# Patient Record
Sex: FEMALE | Race: WHITE | NOT HISPANIC OR LATINO
[De-identification: names, ages, dates, MRNs, and addresses within clinical notes are randomized per-mention and may not be internally consistent; named-entity substitution may affect disease eponyms.]

---

## 2017-01-24 ENCOUNTER — APPOINTMENT (OUTPATIENT)
Dept: CARDIOLOGY | Facility: CLINIC | Age: 77
End: 2017-01-24

## 2017-01-24 ENCOUNTER — APPOINTMENT (OUTPATIENT)
Dept: ELECTROPHYSIOLOGY | Facility: CLINIC | Age: 77
End: 2017-01-24

## 2017-01-24 ENCOUNTER — NON-APPOINTMENT (OUTPATIENT)
Age: 77
End: 2017-01-24

## 2017-01-24 VITALS
HEIGHT: 65.5 IN | DIASTOLIC BLOOD PRESSURE: 89 MMHG | BODY MASS INDEX: 32.26 KG/M2 | HEART RATE: 85 BPM | OXYGEN SATURATION: 98 % | WEIGHT: 196 LBS | SYSTOLIC BLOOD PRESSURE: 140 MMHG

## 2017-01-25 ENCOUNTER — RESULT CHARGE (OUTPATIENT)
Age: 77
End: 2017-01-25

## 2017-02-27 ENCOUNTER — RX RENEWAL (OUTPATIENT)
Age: 77
End: 2017-02-27

## 2017-03-07 ENCOUNTER — RX RENEWAL (OUTPATIENT)
Age: 77
End: 2017-03-07

## 2017-03-27 ENCOUNTER — NON-APPOINTMENT (OUTPATIENT)
Age: 77
End: 2017-03-27

## 2017-03-27 ENCOUNTER — APPOINTMENT (OUTPATIENT)
Dept: CARDIOLOGY | Facility: CLINIC | Age: 77
End: 2017-03-27

## 2017-03-27 VITALS
SYSTOLIC BLOOD PRESSURE: 157 MMHG | OXYGEN SATURATION: 98 % | BODY MASS INDEX: 32.92 KG/M2 | WEIGHT: 200 LBS | HEIGHT: 65.5 IN | DIASTOLIC BLOOD PRESSURE: 85 MMHG | HEART RATE: 75 BPM

## 2017-03-27 RX ORDER — ATORVASTATIN CALCIUM 10 MG/1
10 TABLET, FILM COATED ORAL
Qty: 90 | Refills: 3 | Status: DISCONTINUED | COMMUNITY
Start: 2017-02-27 | End: 2017-03-27

## 2017-04-05 ENCOUNTER — APPOINTMENT (OUTPATIENT)
Dept: CARDIOLOGY | Facility: CLINIC | Age: 77
End: 2017-04-05

## 2017-04-05 ENCOUNTER — OTHER (OUTPATIENT)
Age: 77
End: 2017-04-05

## 2017-05-23 ENCOUNTER — TRANSCRIPTION ENCOUNTER (OUTPATIENT)
Age: 77
End: 2017-05-23

## 2017-06-21 ENCOUNTER — NON-APPOINTMENT (OUTPATIENT)
Age: 77
End: 2017-06-21

## 2017-06-21 ENCOUNTER — APPOINTMENT (OUTPATIENT)
Dept: CARDIOLOGY | Facility: CLINIC | Age: 77
End: 2017-06-21

## 2017-06-21 VITALS
WEIGHT: 200 LBS | BODY MASS INDEX: 32.92 KG/M2 | SYSTOLIC BLOOD PRESSURE: 149 MMHG | HEIGHT: 65.5 IN | OXYGEN SATURATION: 94 % | DIASTOLIC BLOOD PRESSURE: 86 MMHG | HEART RATE: 96 BPM

## 2017-09-25 ENCOUNTER — APPOINTMENT (OUTPATIENT)
Dept: CARDIOLOGY | Facility: CLINIC | Age: 77
End: 2017-09-25
Payer: MEDICARE

## 2017-09-25 ENCOUNTER — NON-APPOINTMENT (OUTPATIENT)
Age: 77
End: 2017-09-25

## 2017-09-25 VITALS
DIASTOLIC BLOOD PRESSURE: 78 MMHG | HEIGHT: 65.5 IN | WEIGHT: 192 LBS | SYSTOLIC BLOOD PRESSURE: 136 MMHG | OXYGEN SATURATION: 96 % | BODY MASS INDEX: 31.6 KG/M2 | HEART RATE: 82 BPM

## 2017-09-25 PROCEDURE — 93000 ELECTROCARDIOGRAM COMPLETE: CPT

## 2017-11-18 ENCOUNTER — TRANSCRIPTION ENCOUNTER (OUTPATIENT)
Age: 77
End: 2017-11-18

## 2018-01-11 ENCOUNTER — NON-APPOINTMENT (OUTPATIENT)
Age: 78
End: 2018-01-11

## 2018-01-11 ENCOUNTER — APPOINTMENT (OUTPATIENT)
Dept: CARDIOLOGY | Facility: CLINIC | Age: 78
End: 2018-01-11
Payer: MEDICARE

## 2018-01-11 VITALS
HEART RATE: 82 BPM | DIASTOLIC BLOOD PRESSURE: 80 MMHG | WEIGHT: 183 LBS | HEIGHT: 65.5 IN | SYSTOLIC BLOOD PRESSURE: 125 MMHG | BODY MASS INDEX: 30.12 KG/M2 | OXYGEN SATURATION: 96 %

## 2018-01-11 PROCEDURE — 93000 ELECTROCARDIOGRAM COMPLETE: CPT

## 2018-01-11 PROCEDURE — 99214 OFFICE O/P EST MOD 30 MIN: CPT

## 2018-04-16 ENCOUNTER — OUTPATIENT (OUTPATIENT)
Dept: OUTPATIENT SERVICES | Facility: HOSPITAL | Age: 78
LOS: 1 days | End: 2018-04-16
Payer: MEDICARE

## 2018-04-16 VITALS
TEMPERATURE: 98 F | HEIGHT: 66 IN | RESPIRATION RATE: 16 BRPM | DIASTOLIC BLOOD PRESSURE: 67 MMHG | HEART RATE: 77 BPM | WEIGHT: 179.9 LBS | OXYGEN SATURATION: 98 % | SYSTOLIC BLOOD PRESSURE: 152 MMHG

## 2018-04-16 DIAGNOSIS — Z98.89 OTHER SPECIFIED POSTPROCEDURAL STATES: Chronic | ICD-10-CM

## 2018-04-16 DIAGNOSIS — R00.2 PALPITATIONS: ICD-10-CM

## 2018-04-16 PROCEDURE — 33284: CPT

## 2018-04-16 PROCEDURE — 93010 ELECTROCARDIOGRAM REPORT: CPT

## 2018-04-16 PROCEDURE — 93005 ELECTROCARDIOGRAM TRACING: CPT

## 2018-04-16 NOTE — H&P CARDIOLOGY - HISTORY OF PRESENT ILLNESS
76 y/o female with pmh of HTN, HLD, Hodgkin's lymphoma currently in remission, GERD, obesity, DVTof left axillar vein (chronic) no A/C, pericardial effusion, nephrolithiasis, Asthma, afib x 1 6/2014 s/p ILR 11/2016 with no evidence of arrhythmia followed up with Dr. TERENCE Monroe, EP and presents today for loop explant.  Pt has been off Xarelto for at least 1 year.  Pt denies cp, sob or palpitations.

## 2018-04-16 NOTE — H&P CARDIOLOGY - FAMILY HISTORY
Atrial fibrillation     Sibling  Still living? No  Family history of substance abuse, Age at diagnosis: Age Unknown

## 2018-04-16 NOTE — H&P CARDIOLOGY - PMH
BCC (basal cell carcinoma), lip  currently on Coumadin, took last dose yesterday, 12/9/14, today will taking nothing, starts Lovenox tomorrow, 9/11/14 for 4 days, pt. states as per "the NurseHilary in Dr. Coles's office"  DVT of axillary vein, chronic left    Fatty liver    GERD (gastroesophageal reflux disease)    Hodgkin lymphoma    HTN (hypertension)    Murmur    Obese    Reactive airway disease    Renal calculi

## 2018-06-14 ENCOUNTER — NON-APPOINTMENT (OUTPATIENT)
Age: 78
End: 2018-06-14

## 2018-06-14 ENCOUNTER — APPOINTMENT (OUTPATIENT)
Dept: CARDIOLOGY | Facility: CLINIC | Age: 78
End: 2018-06-14
Payer: MEDICARE

## 2018-06-14 VITALS
BODY MASS INDEX: 30.95 KG/M2 | OXYGEN SATURATION: 97 % | HEART RATE: 92 BPM | SYSTOLIC BLOOD PRESSURE: 117 MMHG | DIASTOLIC BLOOD PRESSURE: 67 MMHG | WEIGHT: 188 LBS | HEIGHT: 65.5 IN

## 2018-06-14 DIAGNOSIS — I31.3 PERICARDIAL EFFUSION (NONINFLAMMATORY): ICD-10-CM

## 2018-06-14 PROCEDURE — 99214 OFFICE O/P EST MOD 30 MIN: CPT

## 2018-06-14 PROCEDURE — 93000 ELECTROCARDIOGRAM COMPLETE: CPT

## 2018-06-27 ENCOUNTER — RECORD ABSTRACTING (OUTPATIENT)
Age: 78
End: 2018-06-27

## 2018-06-27 DIAGNOSIS — C85.80 OTHER SPECIFIED TYPES OF NON-HODGKIN LYMPHOMA, UNSPECIFIED SITE: ICD-10-CM

## 2018-07-06 ENCOUNTER — APPOINTMENT (OUTPATIENT)
Dept: PULMONOLOGY | Facility: CLINIC | Age: 78
End: 2018-07-06

## 2018-07-19 ENCOUNTER — APPOINTMENT (OUTPATIENT)
Dept: PULMONOLOGY | Facility: CLINIC | Age: 78
End: 2018-07-19
Payer: MEDICARE

## 2018-07-19 VITALS
DIASTOLIC BLOOD PRESSURE: 72 MMHG | OXYGEN SATURATION: 97 % | SYSTOLIC BLOOD PRESSURE: 111 MMHG | RESPIRATION RATE: 16 BRPM | HEART RATE: 88 BPM

## 2018-07-19 PROCEDURE — 99213 OFFICE O/P EST LOW 20 MIN: CPT | Mod: 25

## 2018-07-19 PROCEDURE — 94010 BREATHING CAPACITY TEST: CPT

## 2018-07-19 RX ORDER — TRIAMCINOLONE ACETONIDE 1 MG/G
0.1 CREAM TOPICAL
Qty: 15 | Refills: 0 | Status: DISCONTINUED | COMMUNITY
Start: 2018-01-17 | End: 2018-07-19

## 2018-07-19 RX ORDER — LOSARTAN POTASSIUM AND HYDROCHLOROTHIAZIDE 100; 12.5 MG/1; MG/1
100-12.5 TABLET, FILM COATED ORAL DAILY
Refills: 0 | Status: DISCONTINUED | COMMUNITY
End: 2018-07-19

## 2018-07-19 RX ORDER — IVERMECTIN 10 MG/G
1 CREAM TOPICAL
Qty: 45 | Refills: 0 | Status: DISCONTINUED | COMMUNITY
Start: 2018-01-29 | End: 2018-07-19

## 2018-07-19 RX ORDER — NYSTATIN 100000 [USP'U]/G
100000 CREAM TOPICAL
Qty: 15 | Refills: 0 | Status: DISCONTINUED | COMMUNITY
Start: 2018-01-17 | End: 2018-07-19

## 2018-09-27 ENCOUNTER — TRANSCRIPTION ENCOUNTER (OUTPATIENT)
Age: 78
End: 2018-09-27

## 2018-10-18 ENCOUNTER — APPOINTMENT (OUTPATIENT)
Dept: PULMONOLOGY | Facility: CLINIC | Age: 78
End: 2018-10-18
Payer: MEDICARE

## 2018-10-18 VITALS
DIASTOLIC BLOOD PRESSURE: 77 MMHG | OXYGEN SATURATION: 93 % | SYSTOLIC BLOOD PRESSURE: 125 MMHG | HEART RATE: 80 BPM | RESPIRATION RATE: 16 BRPM

## 2018-10-18 PROCEDURE — 94618 PULMONARY STRESS TESTING: CPT

## 2018-10-18 PROCEDURE — 99213 OFFICE O/P EST LOW 20 MIN: CPT | Mod: 25

## 2018-10-18 PROCEDURE — 94060 EVALUATION OF WHEEZING: CPT | Mod: 59

## 2018-11-21 ENCOUNTER — RX CHANGE (OUTPATIENT)
Age: 78
End: 2018-11-21

## 2018-11-23 ENCOUNTER — RX RENEWAL (OUTPATIENT)
Age: 78
End: 2018-11-23

## 2019-01-04 ENCOUNTER — NON-APPOINTMENT (OUTPATIENT)
Age: 79
End: 2019-01-04

## 2019-01-04 ENCOUNTER — APPOINTMENT (OUTPATIENT)
Dept: CARDIOLOGY | Facility: CLINIC | Age: 79
End: 2019-01-04
Payer: MEDICARE

## 2019-01-04 VITALS
HEART RATE: 73 BPM | WEIGHT: 194 LBS | SYSTOLIC BLOOD PRESSURE: 143 MMHG | DIASTOLIC BLOOD PRESSURE: 84 MMHG | OXYGEN SATURATION: 97 % | HEIGHT: 65.5 IN | BODY MASS INDEX: 31.93 KG/M2

## 2019-01-04 PROCEDURE — 93000 ELECTROCARDIOGRAM COMPLETE: CPT

## 2019-01-04 PROCEDURE — 99214 OFFICE O/P EST MOD 30 MIN: CPT

## 2019-01-08 ENCOUNTER — APPOINTMENT (OUTPATIENT)
Dept: CARDIOLOGY | Facility: CLINIC | Age: 79
End: 2019-01-08
Payer: MEDICARE

## 2019-01-08 PROCEDURE — 93268 ECG RECORD/REVIEW: CPT

## 2019-01-15 ENCOUNTER — APPOINTMENT (OUTPATIENT)
Dept: CARDIOLOGY | Facility: CLINIC | Age: 79
End: 2019-01-15
Payer: MEDICARE

## 2019-01-15 PROCEDURE — 93880 EXTRACRANIAL BILAT STUDY: CPT

## 2019-01-17 ENCOUNTER — APPOINTMENT (OUTPATIENT)
Dept: CARDIOLOGY | Facility: CLINIC | Age: 79
End: 2019-01-17
Payer: MEDICARE

## 2019-01-17 PROCEDURE — 93306 TTE W/DOPPLER COMPLETE: CPT

## 2019-01-28 ENCOUNTER — RX RENEWAL (OUTPATIENT)
Age: 79
End: 2019-01-28

## 2019-01-28 DIAGNOSIS — B35.1 TINEA UNGUIUM: ICD-10-CM

## 2019-01-29 ENCOUNTER — RX RENEWAL (OUTPATIENT)
Age: 79
End: 2019-01-29

## 2019-02-02 ENCOUNTER — RECORD ABSTRACTING (OUTPATIENT)
Age: 79
End: 2019-02-02

## 2019-02-02 DIAGNOSIS — Z86.69 PERSONAL HISTORY OF OTHER DISEASES OF THE NERVOUS SYSTEM AND SENSE ORGANS: ICD-10-CM

## 2019-02-25 ENCOUNTER — APPOINTMENT (OUTPATIENT)
Dept: PULMONOLOGY | Facility: CLINIC | Age: 79
End: 2019-02-25
Payer: MEDICARE

## 2019-02-25 VITALS — SYSTOLIC BLOOD PRESSURE: 132 MMHG | HEART RATE: 89 BPM | OXYGEN SATURATION: 96 % | DIASTOLIC BLOOD PRESSURE: 77 MMHG

## 2019-02-25 PROCEDURE — 99213 OFFICE O/P EST LOW 20 MIN: CPT | Mod: 25

## 2019-02-25 PROCEDURE — 94010 BREATHING CAPACITY TEST: CPT

## 2019-02-25 NOTE — HISTORY OF PRESENT ILLNESS
[Stable] : are stable [Difficulty Breathing During Exertion] : stable dyspnea on exertion [Feelings Of Weakness On Exertion] : stable exercise intolerance [Cough] : denies coughing [Wheezing] : denies wheezing [Regional Soft Tissue Swelling Both Lower Extremities] : denies lower extremity edema [Chest Pain Or Discomfort] : denies chest pain [Fever] : denies fever [Obstructive Sleep Apnea] : obstructive sleep apnea [Date: ___] : Date of most recent diagnostic polysomnogram: [unfilled] [AHI: ___ per hour] : Apnea-hypopnea index:  [unfilled] per hour [Wt Gain ___ Lbs] : no recent weight gain [Oxygen] : the patient uses no supplemental oxygen [FreeTextEntry1] : stable KAN\par s/p past Tuesday received Jeremiasingrx

## 2019-02-25 NOTE — PHYSICAL EXAM
[General Appearance - Well Developed] : well developed [Normal Appearance] : normal appearance [Well Groomed] : well groomed [General Appearance - Well Nourished] : well nourished [No Deformities] : no deformities [General Appearance - In No Acute Distress] : no acute distress [Normal Conjunctiva] : the conjunctiva exhibited no abnormalities [Eyelids - No Xanthelasma] : the eyelids demonstrated no xanthelasmas [Neck Appearance] : the appearance of the neck was normal [Neck Cervical Mass (___cm)] : no neck mass was observed [Jugular Venous Distention Increased] : there was no jugular-venous distention [Thyroid Diffuse Enlargement] : the thyroid was not enlarged [Thyroid Nodule] : there were no palpable thyroid nodules [Heart Rate And Rhythm] : heart rate and rhythm were normal [Heart Sounds] : normal S1 and S2 [Murmurs] : no murmurs present [] : no respiratory distress [Respiration, Rhythm And Depth] : normal respiratory rhythm and effort [Exaggerated Use Of Accessory Muscles For Inspiration] : no accessory muscle use [Auscultation Breath Sounds / Voice Sounds] : lungs were clear to auscultation bilaterally [Chest Palpation] : palpation of the chest revealed no abnormalities [Lungs Percussion] : the lungs were normal to percussion [Abnormal Walk] : normal gait [Gait - Sufficient For Exercise Testing] : the gait was sufficient for exercise testing [Deep Tendon Reflexes (DTR)] : deep tendon reflexes were 2+ and symmetric [Sensation] : the sensory exam was normal to light touch and pinprick [No Focal Deficits] : no focal deficits [Oriented To Time, Place, And Person] : oriented to person, place, and time [Impaired Insight] : insight and judgment were intact [Affect] : the affect was normal

## 2019-02-25 NOTE — DISCUSSION/SUMMARY
[FreeTextEntry1] : Cardiology noted Dr Cordero\par  ECHO\par  event monitor\par  Carotid duplex \par \par Derm  not consistent with Fungus\par \par KAN without decline\par \par Inactive Lymphoma\par \par May PFT\par \par Issue SAhringrx second dose  next 2 - months\par \par  no

## 2019-02-25 NOTE — PROCEDURE
[FreeTextEntry1] : Spirometry 2/25/2019\par Normal Flow Rates\par \par PET CT  No Evidence active disease\par \par Pulmonary Step Six Minute Walk \par Neg\par  No desaturation\par  \par FLUshot Sept \par \par ECHO 1/2019\par  diastolic dysfx\par No reported PAP

## 2019-02-25 NOTE — REVIEW OF SYSTEMS
[As Noted in HPI] : as noted in HPI [Negative] : Pulmonary Hypertension [FreeTextEntry9] : HARMONY BLACKWELLD

## 2019-04-22 ENCOUNTER — RX RENEWAL (OUTPATIENT)
Age: 79
End: 2019-04-22

## 2019-04-23 ENCOUNTER — APPOINTMENT (OUTPATIENT)
Dept: PULMONOLOGY | Facility: CLINIC | Age: 79
End: 2019-04-23
Payer: MEDICARE

## 2019-04-23 VITALS
HEART RATE: 85 BPM | OXYGEN SATURATION: 93 % | SYSTOLIC BLOOD PRESSURE: 131 MMHG | RESPIRATION RATE: 16 BRPM | DIASTOLIC BLOOD PRESSURE: 80 MMHG

## 2019-04-23 LAB — POCT - HEMOGLOBIN (HGB), QUANTITATIVE, TRANSCUTANEOUS: 14.3

## 2019-04-23 PROCEDURE — 94729 DIFFUSING CAPACITY: CPT

## 2019-04-23 PROCEDURE — 99213 OFFICE O/P EST LOW 20 MIN: CPT | Mod: 25

## 2019-04-23 PROCEDURE — 88738 HGB QUANT TRANSCUTANEOUS: CPT

## 2019-04-23 PROCEDURE — 94060 EVALUATION OF WHEEZING: CPT

## 2019-04-23 PROCEDURE — 94727 GAS DIL/WSHOT DETER LNG VOL: CPT

## 2019-04-23 PROCEDURE — 95012 NITRIC OXIDE EXP GAS DETER: CPT

## 2019-04-23 NOTE — HISTORY OF PRESENT ILLNESS
[Stable] : are stable [Difficulty Breathing During Exertion] : stable dyspnea on exertion [Feelings Of Weakness On Exertion] : stable exercise intolerance [Wheezing] : denies wheezing [Cough] : denies coughing [Regional Soft Tissue Swelling Both Lower Extremities] : denies lower extremity edema [Chest Pain Or Discomfort] : denies chest pain [Fever] : denies fever [Obstructive Sleep Apnea] : obstructive sleep apnea [Date: ___] : Date of most recent diagnostic polysomnogram: [unfilled] [AHI: ___ per hour] : Apnea-hypopnea index:  [unfilled] per hour [Wt Gain ___ Lbs] : no recent weight gain [Oxygen] : the patient uses no supplemental oxygen [FreeTextEntry1] : mild positional DINH

## 2019-04-23 NOTE — REASON FOR VISIT
[Follow-Up] : a follow-up visit [Shortness of Breath] : shortness of Breath [FreeTextEntry2] : Hx NHL

## 2019-04-23 NOTE — PHYSICAL EXAM
[General Appearance - Well Developed] : well developed [Normal Appearance] : normal appearance [Well Groomed] : well groomed [General Appearance - Well Nourished] : well nourished [No Deformities] : no deformities [General Appearance - In No Acute Distress] : no acute distress [Normal Conjunctiva] : the conjunctiva exhibited no abnormalities [Eyelids - No Xanthelasma] : the eyelids demonstrated no xanthelasmas [Normal Oropharynx] : normal oropharynx [II] : II [Neck Appearance] : the appearance of the neck was normal [Neck Cervical Mass (___cm)] : no neck mass was observed [Jugular Venous Distention Increased] : there was no jugular-venous distention [Thyroid Diffuse Enlargement] : the thyroid was not enlarged [Thyroid Nodule] : there were no palpable thyroid nodules [Heart Rate And Rhythm] : heart rate and rhythm were normal [Heart Sounds] : normal S1 and S2 [Murmurs] : no murmurs present [Edema] : no peripheral edema present [Arterial Pulses Normal] : the arterial pulses were normal [Veins - Varicosity Changes] : no varicosital changes were noted in the lower extremities [Exaggerated Use Of Accessory Muscles For Inspiration] : no accessory muscle use [Respiration, Rhythm And Depth] : normal respiratory rhythm and effort [Auscultation Breath Sounds / Voice Sounds] : lungs were clear to auscultation bilaterally [Chest Palpation] : palpation of the chest revealed no abnormalities [Lungs Percussion] : the lungs were normal to percussion [Gait - Sufficient For Exercise Testing] : the gait was sufficient for exercise testing [Abnormal Walk] : normal gait [Deep Tendon Reflexes (DTR)] : deep tendon reflexes were 2+ and symmetric [Sensation] : the sensory exam was normal to light touch and pinprick [No Focal Deficits] : no focal deficits [Oriented To Time, Place, And Person] : oriented to person, place, and time [Impaired Insight] : insight and judgment were intact [Affect] : the affect was normal [Bowel Sounds] : normal bowel sounds [Abdomen Soft] : soft [Abdomen Tenderness] : non-tender [Abdomen Mass (___ Cm)] : no abdominal mass palpated [Nail Clubbing] : no clubbing of the fingernails [Cyanosis, Localized] : no localized cyanosis [Petechial Hemorrhages (___cm)] : no petechial hemorrhages [] : no ischemic changes

## 2019-04-23 NOTE — DISCUSSION/SUMMARY
[FreeTextEntry1] : Cardiology noted Dr Cordero\par  ECHO\par  event monitor\par  Carotid duplex \par \par 1/28/19\par  tx nail  fungus\par added lotrisone cream \par discuss lamsil at f/u and  will require check  baseline LFT\par \par PET CT per Oncology July 2019\par Trial singulair 10 mg\par  Flonase  2 sprays beach nostril QD

## 2019-04-23 NOTE — PROCEDURE
[FreeTextEntry1] : PFT 4/23/2019\par Normal Flow s Rates\par Lung volumes normal\par  DLCO 77 %\par HGB14.3\par \par PET CT  No Evidence active disease July 2018\par \par Pulmonary Step Six Minute Walk \par Neg\par  No desaturation\par  \par FLUshot Sept \par \par ECHO 1/2019\par  diastolic dysfx\par No reported PAP

## 2019-05-22 ENCOUNTER — RX RENEWAL (OUTPATIENT)
Age: 79
End: 2019-05-22

## 2019-05-22 DIAGNOSIS — K64.9 UNSPECIFIED HEMORRHOIDS: ICD-10-CM

## 2019-06-04 ENCOUNTER — APPOINTMENT (OUTPATIENT)
Dept: PULMONOLOGY | Facility: CLINIC | Age: 79
End: 2019-06-04

## 2019-06-19 ENCOUNTER — APPOINTMENT (OUTPATIENT)
Dept: PULMONOLOGY | Facility: CLINIC | Age: 79
End: 2019-06-19
Payer: MEDICARE

## 2019-06-19 VITALS
HEART RATE: 84 BPM | OXYGEN SATURATION: 95 % | SYSTOLIC BLOOD PRESSURE: 113 MMHG | TEMPERATURE: 98.7 F | DIASTOLIC BLOOD PRESSURE: 73 MMHG

## 2019-06-19 PROCEDURE — 94729 DIFFUSING CAPACITY: CPT

## 2019-06-19 PROCEDURE — 99213 OFFICE O/P EST LOW 20 MIN: CPT | Mod: 25

## 2019-06-19 PROCEDURE — 94727 GAS DIL/WSHOT DETER LNG VOL: CPT

## 2019-06-19 PROCEDURE — 94010 BREATHING CAPACITY TEST: CPT

## 2019-06-19 NOTE — PHYSICAL EXAM
[General Appearance - Well Developed] : well developed [Normal Appearance] : normal appearance [Well Groomed] : well groomed [General Appearance - Well Nourished] : well nourished [No Deformities] : no deformities [General Appearance - In No Acute Distress] : no acute distress [Normal Conjunctiva] : the conjunctiva exhibited no abnormalities [Eyelids - No Xanthelasma] : the eyelids demonstrated no xanthelasmas [Normal Oropharynx] : normal oropharynx [II] : II [Neck Appearance] : the appearance of the neck was normal [Neck Cervical Mass (___cm)] : no neck mass was observed [Jugular Venous Distention Increased] : there was no jugular-venous distention [Thyroid Diffuse Enlargement] : the thyroid was not enlarged [Thyroid Nodule] : there were no palpable thyroid nodules [Heart Rate And Rhythm] : heart rate and rhythm were normal [Heart Sounds] : normal S1 and S2 [Murmurs] : no murmurs present [Edema] : no peripheral edema present [Arterial Pulses Normal] : the arterial pulses were normal [Veins - Varicosity Changes] : no varicosital changes were noted in the lower extremities [Respiration, Rhythm And Depth] : normal respiratory rhythm and effort [Auscultation Breath Sounds / Voice Sounds] : lungs were clear to auscultation bilaterally [Exaggerated Use Of Accessory Muscles For Inspiration] : no accessory muscle use [Chest Palpation] : palpation of the chest revealed no abnormalities [Lungs Percussion] : the lungs were normal to percussion [Bowel Sounds] : normal bowel sounds [Abdomen Soft] : soft [Abdomen Tenderness] : non-tender [Abnormal Walk] : normal gait [Abdomen Mass (___ Cm)] : no abdominal mass palpated [Cyanosis, Localized] : no localized cyanosis [Gait - Sufficient For Exercise Testing] : the gait was sufficient for exercise testing [Nail Clubbing] : no clubbing of the fingernails [] : no ischemic changes [Petechial Hemorrhages (___cm)] : no petechial hemorrhages [No Focal Deficits] : no focal deficits [Deep Tendon Reflexes (DTR)] : deep tendon reflexes were 2+ and symmetric [Sensation] : the sensory exam was normal to light touch and pinprick [Impaired Insight] : insight and judgment were intact [Oriented To Time, Place, And Person] : oriented to person, place, and time [Affect] : the affect was normal

## 2019-06-19 NOTE — HISTORY OF PRESENT ILLNESS
[Stable] : are stable [Difficulty Breathing During Exertion] : stable dyspnea on exertion [Feelings Of Weakness On Exertion] : stable exercise intolerance [Cough] : denies coughing [Wheezing] : denies wheezing [Regional Soft Tissue Swelling Both Lower Extremities] : denies lower extremity edema [Chest Pain Or Discomfort] : denies chest pain [Fever] : denies fever [Obstructive Sleep Apnea] : obstructive sleep apnea [Date: ___] : Date of most recent diagnostic polysomnogram: [unfilled] [AHI: ___ per hour] : Apnea-hypopnea index:  [unfilled] per hour [Wt Gain ___ Lbs] : no recent weight gain [Oxygen] : the patient uses no supplemental oxygen [FreeTextEntry1] : stable KAN\par no cough wheeze sputum\par  s/p PET CT -reviewed and no evidence for recurrent disase

## 2019-06-19 NOTE — DISCUSSION/SUMMARY
[FreeTextEntry1] : Cardiology noted Dr Cordero\par  ECHO\par  event monitor\par  Carotid duplex \par \par PET CT per Oncology July 2019 does not demonstrate any evidence of recurrent lymphoma\par  singulair 10 mg\par  Flonase  2 sprays beach nostril QD\par Pulmonary physiology\par Patient is due by August to complete the second dose of Shingrix\par Well visit next appointment

## 2019-06-19 NOTE — PROCEDURE
[FreeTextEntry1] : PFT 6/19/2019\par Normal Flow s Rates\par Percent response to bronchodilator at FEV1\par Lung volumes normal\par  DLCO 80 %\par HGB14.3\par \par PET CT  No Evidence active disease July 2018\par \par Pulmonary Step Six Minute Walk \par Neg\par  No desaturation\par  \par ECHO 1/2019\par  diastolic dysfx\par No reported PAP

## 2019-06-27 ENCOUNTER — NON-APPOINTMENT (OUTPATIENT)
Age: 79
End: 2019-06-27

## 2019-06-27 ENCOUNTER — APPOINTMENT (OUTPATIENT)
Dept: CARDIOLOGY | Facility: CLINIC | Age: 79
End: 2019-06-27
Payer: MEDICARE

## 2019-06-27 VITALS
BODY MASS INDEX: 36.53 KG/M2 | HEIGHT: 60.55 IN | SYSTOLIC BLOOD PRESSURE: 150 MMHG | WEIGHT: 191 LBS | HEART RATE: 80 BPM | DIASTOLIC BLOOD PRESSURE: 83 MMHG | OXYGEN SATURATION: 97 %

## 2019-06-27 DIAGNOSIS — R00.2 PALPITATIONS: ICD-10-CM

## 2019-06-27 PROCEDURE — 99214 OFFICE O/P EST MOD 30 MIN: CPT

## 2019-06-27 PROCEDURE — 93000 ELECTROCARDIOGRAM COMPLETE: CPT

## 2019-06-27 RX ORDER — FUROSEMIDE 20 MG/1
20 TABLET ORAL DAILY
Qty: 30 | Refills: 3 | Status: DISCONTINUED | COMMUNITY
Start: 2019-01-29 | End: 2019-06-27

## 2019-07-22 ENCOUNTER — RX RENEWAL (OUTPATIENT)
Age: 79
End: 2019-07-22

## 2019-08-22 ENCOUNTER — APPOINTMENT (OUTPATIENT)
Dept: PULMONOLOGY | Facility: CLINIC | Age: 79
End: 2019-08-22
Payer: MEDICARE

## 2019-08-22 VITALS
TEMPERATURE: 98 F | HEART RATE: 85 BPM | BODY MASS INDEX: 31.6 KG/M2 | HEIGHT: 65.5 IN | WEIGHT: 192 LBS | SYSTOLIC BLOOD PRESSURE: 113 MMHG | DIASTOLIC BLOOD PRESSURE: 76 MMHG | OXYGEN SATURATION: 97 % | RESPIRATION RATE: 16 BRPM

## 2019-08-22 LAB
ALBUMIN: 30
BILIRUB UR QL STRIP: NEGATIVE
CLARITY UR: CLEAR
COLLECTION METHOD: NORMAL
CREATININE: 200
GLUCOSE UR-MCNC: NEGATIVE
HCG UR QL: 0.2 EU/DL
HGB UR QL STRIP.AUTO: NEGATIVE
KETONES UR-MCNC: NEGATIVE
LEUKOCYTE ESTERASE UR QL STRIP: NORMAL
MICROALBUMIN/CREAT UR TEST STR-RTO: <30
NITRITE UR QL STRIP: NEGATIVE
PH UR STRIP: 6.5
PROT UR STRIP-MCNC: NEGATIVE
SP GR UR STRIP: 1.01

## 2019-08-22 PROCEDURE — 94010 BREATHING CAPACITY TEST: CPT

## 2019-08-22 PROCEDURE — 81003 URINALYSIS AUTO W/O SCOPE: CPT | Mod: QW

## 2019-08-22 PROCEDURE — 90715 TDAP VACCINE 7 YRS/> IM: CPT | Mod: GY

## 2019-08-22 PROCEDURE — 99214 OFFICE O/P EST MOD 30 MIN: CPT | Mod: 25

## 2019-08-22 PROCEDURE — 82044 UR ALBUMIN SEMIQUANTITATIVE: CPT | Mod: QW

## 2019-08-22 PROCEDURE — 90471 IMMUNIZATION ADMIN: CPT | Mod: GY

## 2019-08-22 PROCEDURE — 36415 COLL VENOUS BLD VENIPUNCTURE: CPT

## 2019-08-22 NOTE — PHYSICAL EXAM
[Normal Appearance] : normal appearance [General Appearance - Well Developed] : well developed [Well Groomed] : well groomed [General Appearance - Well Nourished] : well nourished [General Appearance - In No Acute Distress] : no acute distress [No Deformities] : no deformities [Normal Conjunctiva] : the conjunctiva exhibited no abnormalities [Eyelids - No Xanthelasma] : the eyelids demonstrated no xanthelasmas [Normal Oropharynx] : normal oropharynx [II] : II [Neck Appearance] : the appearance of the neck was normal [Jugular Venous Distention Increased] : there was no jugular-venous distention [Thyroid Diffuse Enlargement] : the thyroid was not enlarged [Neck Cervical Mass (___cm)] : no neck mass was observed [Thyroid Nodule] : there were no palpable thyroid nodules [Heart Rate And Rhythm] : heart rate and rhythm were normal [Heart Sounds] : normal S1 and S2 [Arterial Pulses Normal] : the arterial pulses were normal [Murmurs] : no murmurs present [Veins - Varicosity Changes] : no varicosital changes were noted in the lower extremities [Edema] : no peripheral edema present [Respiration, Rhythm And Depth] : normal respiratory rhythm and effort [Auscultation Breath Sounds / Voice Sounds] : lungs were clear to auscultation bilaterally [Exaggerated Use Of Accessory Muscles For Inspiration] : no accessory muscle use [Chest Palpation] : palpation of the chest revealed no abnormalities [Lungs Percussion] : the lungs were normal to percussion [Bowel Sounds] : normal bowel sounds [Abdomen Soft] : soft [Abdomen Tenderness] : non-tender [Abdomen Mass (___ Cm)] : no abdominal mass palpated [Gait - Sufficient For Exercise Testing] : the gait was sufficient for exercise testing [Abnormal Walk] : normal gait [Nail Clubbing] : no clubbing of the fingernails [Cyanosis, Localized] : no localized cyanosis [] : no ischemic changes [Petechial Hemorrhages (___cm)] : no petechial hemorrhages [Deep Tendon Reflexes (DTR)] : deep tendon reflexes were 2+ and symmetric [Sensation] : the sensory exam was normal to light touch and pinprick [No Focal Deficits] : no focal deficits [Oriented To Time, Place, And Person] : oriented to person, place, and time [Impaired Insight] : insight and judgment were intact [Affect] : the affect was normal

## 2019-08-23 LAB
25(OH)D3 SERPL-MCNC: 36.8 NG/ML
ALBUMIN SERPL ELPH-MCNC: 4.4 G/DL
ALP BLD-CCNC: 105 U/L
ALT SERPL-CCNC: 28 U/L
ANION GAP SERPL CALC-SCNC: 16 MMOL/L
AST SERPL-CCNC: 24 U/L
BASOPHILS # BLD AUTO: 0.04 K/UL
BASOPHILS NFR BLD AUTO: 0.7 %
BILIRUB SERPL-MCNC: 0.5 MG/DL
BUN SERPL-MCNC: 16 MG/DL
CALCIUM SERPL-MCNC: 9.7 MG/DL
CHLORIDE SERPL-SCNC: 106 MMOL/L
CHOLEST SERPL-MCNC: 135 MG/DL
CHOLEST/HDLC SERPL: 3.9 RATIO
CO2 SERPL-SCNC: 24 MMOL/L
CREAT SERPL-MCNC: 0.73 MG/DL
EOSINOPHIL # BLD AUTO: 0.09 K/UL
EOSINOPHIL NFR BLD AUTO: 1.5 %
ESTIMATED AVERAGE GLUCOSE: 114 MG/DL
GLUCOSE SERPL-MCNC: 106 MG/DL
HBA1C MFR BLD HPLC: 5.6 %
HCT VFR BLD CALC: 44.2 %
HCV AB SER QL: NONREACTIVE
HCV S/CO RATIO: 0.1 S/CO
HDLC SERPL-MCNC: 35 MG/DL
HGB BLD-MCNC: 13.8 G/DL
IMM GRANULOCYTES NFR BLD AUTO: 0.3 %
LDLC SERPL CALC-MCNC: 69 MG/DL
LYMPHOCYTES # BLD AUTO: 1.01 K/UL
LYMPHOCYTES NFR BLD AUTO: 16.7 %
MAN DIFF?: NORMAL
MCHC RBC-ENTMCNC: 30.9 PG
MCHC RBC-ENTMCNC: 31.2 GM/DL
MCV RBC AUTO: 99.1 FL
MONOCYTES # BLD AUTO: 0.33 K/UL
MONOCYTES NFR BLD AUTO: 5.5 %
NEUTROPHILS # BLD AUTO: 4.55 K/UL
NEUTROPHILS NFR BLD AUTO: 75.3 %
PLATELET # BLD AUTO: 209 K/UL
POTASSIUM SERPL-SCNC: 3.6 MMOL/L
PROT SERPL-MCNC: 6.5 G/DL
RBC # BLD: 4.46 M/UL
RBC # FLD: 13.7 %
SODIUM SERPL-SCNC: 146 MMOL/L
T4 FREE SERPL-MCNC: 1 NG/DL
T4 SERPL-MCNC: 7.5 UG/DL
TRIGL SERPL-MCNC: 156 MG/DL
TSH SERPL-ACNC: 3.16 UIU/ML
WBC # FLD AUTO: 6.04 K/UL

## 2019-08-23 NOTE — PROCEDURE
[FreeTextEntry1] : PFT 6/19/2019\par Normal Flow s Rates\par Percent response to bronchodilator at FEV1\par Lung volumes normal\par  DLCO 80 %\par HGB14.3\par \par PET CT  No Evidence active disease July 2019\par  \par ECHO 1/2019\par  diastolic dysfx\par No reported PAP\par \par Urine  noted\par Spirometry 8/22/2019\par  without bronchodilator normal flow rates stable\par \par DTaP administration\par Completed to dose shingles Shingrix protocol\par \par Blood Draw\par Data review August 23, 2019\par CBC normal range\par TFTs normal\par Hemoglobin A1c 5.6% with mean plasma glucose 114\par Lipid profile\par Total cholesterol 135 HDL 35 LDL 69\par Triglycerides nonfasting 156\par Vitamin D 36.8\par Serum sodium 146\par Serum potassium 3.6\par BUN 16 creatinine 0.73\par Serum calcium 9.7\par Liver function testing normal

## 2019-08-23 NOTE — DISCUSSION/SUMMARY
[FreeTextEntry1] : Well visit with blood work and urine analysis\par Administered DTaP\par Cardiology up-to-date\par Hematology oncology up-to-date\par Follow-up 3 months\par Mammogram July 2020\par PET/CT as per management hematology oncology Polic reports likely 1 year July 2020

## 2019-08-23 NOTE — REVIEW OF SYSTEMS
[As Noted in HPI] : as noted in HPI [Back Pain] : ~T back pain [Myalgias] : myalgias [Arthralgias] : arthralgias [Negative] : Pulmonary Hypertension [Trauma] : no ~T physical trauma [Fracture] : no fracture [FreeTextEntry9] : HARMONY BLACKWELLD [Kyphoscoliosis] : no kyphoscoliosis

## 2019-08-23 NOTE — HISTORY OF PRESENT ILLNESS
[Stable] : are stable [Difficulty Breathing During Exertion] : stable dyspnea on exertion [Feelings Of Weakness On Exertion] : stable exercise intolerance [Cough] : denies coughing [Wheezing] : denies wheezing [Regional Soft Tissue Swelling Both Lower Extremities] : denies lower extremity edema [Chest Pain Or Discomfort] : denies chest pain [Fever] : denies fever [Date: ___] : Date of most recent diagnostic polysomnogram: [unfilled] [Obstructive Sleep Apnea] : obstructive sleep apnea [AHI: ___ per hour] : Apnea-hypopnea index:  [unfilled] per hour [Wt Gain ___ Lbs] : no recent weight gain [Oxygen] : the patient uses no supplemental oxygen [FreeTextEntry1] : stable KAN\par no cough wheeze sputum\par  s/p PET CT -reviewed and no evidence for recurrent disase

## 2019-10-28 ENCOUNTER — RX CHANGE (OUTPATIENT)
Age: 79
End: 2019-10-28

## 2019-10-28 ENCOUNTER — RX RENEWAL (OUTPATIENT)
Age: 79
End: 2019-10-28

## 2019-10-28 RX ORDER — LOSARTAN POTASSIUM AND HYDROCHLOROTHIAZIDE 12.5; 1 MG/1; MG/1
100-12.5 TABLET ORAL DAILY
Qty: 90 | Refills: 3 | Status: DISCONTINUED | COMMUNITY
Start: 2017-03-27 | End: 2019-10-28

## 2019-11-21 ENCOUNTER — APPOINTMENT (OUTPATIENT)
Dept: PULMONOLOGY | Facility: CLINIC | Age: 79
End: 2019-11-21
Payer: MEDICARE

## 2019-11-21 VITALS
TEMPERATURE: 98.5 F | WEIGHT: 185 LBS | BODY MASS INDEX: 30.82 KG/M2 | SYSTOLIC BLOOD PRESSURE: 118 MMHG | HEIGHT: 65 IN | DIASTOLIC BLOOD PRESSURE: 73 MMHG | HEART RATE: 74 BPM | OXYGEN SATURATION: 95 %

## 2019-11-21 DIAGNOSIS — T30.0 BURN OF UNSPECIFIED BODY REGION, UNSPECIFIED DEGREE: ICD-10-CM

## 2019-11-21 PROCEDURE — 94060 EVALUATION OF WHEEZING: CPT

## 2019-11-21 PROCEDURE — 99213 OFFICE O/P EST LOW 20 MIN: CPT | Mod: 25

## 2019-11-21 RX ORDER — MONTELUKAST 10 MG/1
10 TABLET, FILM COATED ORAL
Qty: 30 | Refills: 3 | Status: DISCONTINUED | COMMUNITY
Start: 2019-04-23 | End: 2019-11-21

## 2019-11-21 RX ORDER — CLOTRIMAZOLE 10 MG/G
1 CREAM TOPICAL
Qty: 1 | Refills: 3 | Status: DISCONTINUED | COMMUNITY
Start: 2019-01-28 | End: 2019-11-21

## 2019-11-21 RX ORDER — HYDROCORTISONE 25 MG/G
2.5 CREAM TOPICAL
Qty: 85.05 | Refills: 0 | Status: DISCONTINUED | COMMUNITY
Start: 2019-05-22 | End: 2019-11-21

## 2019-11-21 RX ORDER — METHENAMINE HIPPURATE 1 G/1
1 TABLET ORAL DAILY
Refills: 0 | Status: ACTIVE | COMMUNITY
Start: 2019-11-21

## 2019-11-21 RX ORDER — AZELASTINE HYDROCHLORIDE 137 UG/1
137 SPRAY, METERED NASAL
Refills: 0 | Status: DISCONTINUED | COMMUNITY
End: 2019-11-21

## 2019-11-21 RX ORDER — RANITIDINE HYDROCHLORIDE 150 MG/1
150 CAPSULE ORAL
Refills: 0 | Status: DISCONTINUED | COMMUNITY
End: 2019-11-21

## 2019-11-21 NOTE — REVIEW OF SYSTEMS
[As Noted in HPI] : as noted in HPI [Back Pain] : ~T back pain [Myalgias] : myalgias [Arthralgias] : arthralgias [Negative] : Pulmonary Hypertension [Trauma] : no ~T physical trauma [Fracture] : no fracture [Kyphoscoliosis] : no kyphoscoliosis [FreeTextEntry9] : HARMONY BLACKWELLD

## 2019-11-21 NOTE — PROCEDURE
[FreeTextEntry1] : Spirometry  11/19/2019\par  Normal  flow  rates\par  No BD  at  FEV!\par \par PFT 6/19/2019\par Normal Flow s Rates\par  12 Percent response to bronchodilator at FEV1\par Lung volumes normal\par  DLCO 80 %\par HGB14.3\par \par PET CT  No Evidence active disease July 2019\par  \par ECHO 1/2019\par  diastolic dysfx\par No reported PAP\par \par Urine  noted\par Spirometry 8/22/2019\par  without bronchodilator normal flow rates stable\par \par DTaP administration\par Completed to dose shingles Shingrix protocol\par \par Blood Draw\par Data review August 23, 2019\par CBC normal range\par TFTs normal\par Hemoglobin A1c 5.6% with mean plasma glucose 114\par Lipid profile\par Total cholesterol 135 HDL 35 LDL 69\par Triglycerides nonfasting 156\par Vitamin D 36.8\par Serum sodium 146\par Serum potassium 3.6\par BUN 16 creatinine 0.73\par Serum calcium 9.7\par Liver function testing normal

## 2019-11-21 NOTE — DISCUSSION/SUMMARY
[FreeTextEntry1] : Cardiology noted Dr Cordero\par  ECHO\par  event monitor\par  Carotid duplex \par \par PET CT per Oncology July 2019\par Trial singulair 10 mg\par  Flonase  2 sprays beach nostril QD\par \par Stable KAN-  with  component deconditioning\par Hodgkins x 5 yr  remission\par ECHO 1/2019- diastolic dysfx\par PPM\par  Right  hand  first  degree burn- silvadene\par Flu  shot up to  date

## 2019-11-21 NOTE — PHYSICAL EXAM
[General Appearance - Well Developed] : well developed [Normal Appearance] : normal appearance [Well Groomed] : well groomed [General Appearance - Well Nourished] : well nourished [No Deformities] : no deformities [General Appearance - In No Acute Distress] : no acute distress [Normal Conjunctiva] : the conjunctiva exhibited no abnormalities [Eyelids - No Xanthelasma] : the eyelids demonstrated no xanthelasmas [Normal Oropharynx] : normal oropharynx [II] : II [Neck Appearance] : the appearance of the neck was normal [Neck Cervical Mass (___cm)] : no neck mass was observed [Jugular Venous Distention Increased] : there was no jugular-venous distention [Thyroid Diffuse Enlargement] : the thyroid was not enlarged [Thyroid Nodule] : there were no palpable thyroid nodules [Heart Rate And Rhythm] : heart rate and rhythm were normal [Heart Sounds] : normal S1 and S2 [Murmurs] : no murmurs present [Arterial Pulses Normal] : the arterial pulses were normal [Edema] : no peripheral edema present [Veins - Varicosity Changes] : no varicosital changes were noted in the lower extremities [Respiration, Rhythm And Depth] : normal respiratory rhythm and effort [Exaggerated Use Of Accessory Muscles For Inspiration] : no accessory muscle use [Auscultation Breath Sounds / Voice Sounds] : lungs were clear to auscultation bilaterally [Chest Palpation] : palpation of the chest revealed no abnormalities [Lungs Percussion] : the lungs were normal to percussion [Bowel Sounds] : normal bowel sounds [Abdomen Soft] : soft [Abdomen Tenderness] : non-tender [Abdomen Mass (___ Cm)] : no abdominal mass palpated [Abnormal Walk] : normal gait [Gait - Sufficient For Exercise Testing] : the gait was sufficient for exercise testing [Nail Clubbing] : no clubbing of the fingernails [Cyanosis, Localized] : no localized cyanosis [Petechial Hemorrhages (___cm)] : no petechial hemorrhages [] : no ischemic changes [Deep Tendon Reflexes (DTR)] : deep tendon reflexes were 2+ and symmetric [Sensation] : the sensory exam was normal to light touch and pinprick [No Focal Deficits] : no focal deficits [Oriented To Time, Place, And Person] : oriented to person, place, and time [Impaired Insight] : insight and judgment were intact [Affect] : the affect was normal [FreeTextEntry1] : rigfht hand erythma burn  first  degreee no evidence pustules cysts or  drainage

## 2020-02-07 ENCOUNTER — TRANSCRIPTION ENCOUNTER (OUTPATIENT)
Age: 80
End: 2020-02-07

## 2020-02-07 ENCOUNTER — APPOINTMENT (OUTPATIENT)
Dept: PULMONOLOGY | Facility: CLINIC | Age: 80
End: 2020-02-07
Payer: MEDICARE

## 2020-02-07 VITALS
HEART RATE: 82 BPM | RESPIRATION RATE: 16 BRPM | TEMPERATURE: 98.2 F | OXYGEN SATURATION: 97 % | DIASTOLIC BLOOD PRESSURE: 79 MMHG | SYSTOLIC BLOOD PRESSURE: 133 MMHG

## 2020-02-07 DIAGNOSIS — R68.89 OTHER GENERAL SYMPTOMS AND SIGNS: ICD-10-CM

## 2020-02-07 LAB
FLUAV SPEC QL CULT: NORMAL
FLUBV AG SPEC QL IA: NORMAL
S PYO AG SPEC QL IA: NORMAL

## 2020-02-07 PROCEDURE — 71046 X-RAY EXAM CHEST 2 VIEWS: CPT

## 2020-02-07 PROCEDURE — 99213 OFFICE O/P EST LOW 20 MIN: CPT | Mod: 25

## 2020-02-07 PROCEDURE — 87880 STREP A ASSAY W/OPTIC: CPT | Mod: QW

## 2020-02-07 PROCEDURE — 87804 INFLUENZA ASSAY W/OPTIC: CPT | Mod: QW

## 2020-02-08 LAB — RAPID RVP RESULT: NOT DETECTED

## 2020-02-08 NOTE — DISCUSSION/SUMMARY
[FreeTextEntry1] : URI with cough\par Nasal pharyngeal salt water rinse\par No indication for antibiotics\par Tylenol as needed\par RVP pending\par \par Cardiology noted Dr Cordero\par  ECHO\par  event monitor\par  Carotid duplex \par \par PET CT per Oncology July 2019\par Trial singulair 10 mg\par  Flonase  2 sprays beach nostril QD\par \par Stable KAN-  with  component deconditioning\par Hodgkins x 5 yr  remission\par ECHO 1/2019- diastolic dysfx\par PPM\par Flu  shot up to  date

## 2020-02-08 NOTE — PROCEDURE
[FreeTextEntry1] : Rapid flu negative\par Rapid strep negative\par RVP negative\par \par X-ray PA lateral February 7, 2020\par Normal cardiac size\par No parenchymal infiltrate pleural effusions dominant pulmonary nodules\par Impression clear lungs\par \par Spirometry  11/19/2019\par  Normal  flow  rates\par  No BD  at  FEV!\par \par PFT 6/19/2019\par Normal Flow s Rates\par  12 Percent response to bronchodilator at FEV1\par Lung volumes normal\par  DLCO 80 %\par HGB14.3\par \par PET CT  No Evidence active disease July 2019\par  \par ECHO 1/2019\par  diastolic dysfx\par No reported PAP\par \par Urine  noted\par Spirometry 8/22/2019\par  without bronchodilator normal flow rates stable\par \par DTaP administration\par Completed to dose shingles Shingrix protocol\par \par Blood Draw\par Data review August 23, 2019\par CBC normal range\par TFTs normal\par Hemoglobin A1c 5.6% with mean plasma glucose 114\par Lipid profile\par Total cholesterol 135 HDL 35 LDL 69\par Triglycerides nonfasting 156\par Vitamin D 36.8\par Serum sodium 146\par Serum potassium 3.6\par BUN 16 creatinine 0.73\par Serum calcium 9.7\par Liver function testing normal

## 2020-02-08 NOTE — PHYSICAL EXAM
[General Appearance - Well Developed] : well developed [Normal Appearance] : normal appearance [Well Groomed] : well groomed [No Deformities] : no deformities [General Appearance - Well Nourished] : well nourished [General Appearance - In No Acute Distress] : no acute distress [Normal Conjunctiva] : the conjunctiva exhibited no abnormalities [Eyelids - No Xanthelasma] : the eyelids demonstrated no xanthelasmas [Normal Oropharynx] : normal oropharynx [Neck Appearance] : the appearance of the neck was normal [II] : II [Neck Cervical Mass (___cm)] : no neck mass was observed [Jugular Venous Distention Increased] : there was no jugular-venous distention [Thyroid Diffuse Enlargement] : the thyroid was not enlarged [Thyroid Nodule] : there were no palpable thyroid nodules [Heart Rate And Rhythm] : heart rate and rhythm were normal [Heart Sounds] : normal S1 and S2 [Murmurs] : no murmurs present [Arterial Pulses Normal] : the arterial pulses were normal [Edema] : no peripheral edema present [Veins - Varicosity Changes] : no varicosital changes were noted in the lower extremities [Respiration, Rhythm And Depth] : normal respiratory rhythm and effort [Exaggerated Use Of Accessory Muscles For Inspiration] : no accessory muscle use [Auscultation Breath Sounds / Voice Sounds] : lungs were clear to auscultation bilaterally [Chest Palpation] : palpation of the chest revealed no abnormalities [Lungs Percussion] : the lungs were normal to percussion [Bowel Sounds] : normal bowel sounds [Abdomen Soft] : soft [Abdomen Tenderness] : non-tender [Abdomen Mass (___ Cm)] : no abdominal mass palpated [Abnormal Walk] : normal gait [Gait - Sufficient For Exercise Testing] : the gait was sufficient for exercise testing [Cyanosis, Localized] : no localized cyanosis [Nail Clubbing] : no clubbing of the fingernails [Petechial Hemorrhages (___cm)] : no petechial hemorrhages [] : no ischemic changes [Deep Tendon Reflexes (DTR)] : deep tendon reflexes were 2+ and symmetric [Sensation] : the sensory exam was normal to light touch and pinprick [Oriented To Time, Place, And Person] : oriented to person, place, and time [No Focal Deficits] : no focal deficits [Impaired Insight] : insight and judgment were intact [Affect] : the affect was normal [FreeTextEntry1] : rigfht hand erythma burn  first  degreee no evidence pustules cysts or  drainage

## 2020-02-08 NOTE — REVIEW OF SYSTEMS
[As Noted in HPI] : as noted in HPI [Myalgias] : myalgias [Back Pain] : ~T back pain [Arthralgias] : arthralgias [Negative] : Pulmonary Hypertension [Fracture] : no fracture [Kyphoscoliosis] : no kyphoscoliosis [Trauma] : no ~T physical trauma [FreeTextEntry9] : HARMONY BLACKWELLD

## 2020-02-13 ENCOUNTER — RX RENEWAL (OUTPATIENT)
Age: 80
End: 2020-02-13

## 2020-03-08 ENCOUNTER — TRANSCRIPTION ENCOUNTER (OUTPATIENT)
Age: 80
End: 2020-03-08

## 2020-04-06 ENCOUNTER — RX RENEWAL (OUTPATIENT)
Age: 80
End: 2020-04-06

## 2020-05-01 ENCOUNTER — APPOINTMENT (OUTPATIENT)
Dept: PULMONOLOGY | Facility: CLINIC | Age: 80
End: 2020-05-01

## 2020-05-01 ENCOUNTER — APPOINTMENT (OUTPATIENT)
Dept: PULMONOLOGY | Facility: CLINIC | Age: 80
End: 2020-05-01
Payer: MEDICARE

## 2020-05-01 PROCEDURE — 99214 OFFICE O/P EST MOD 30 MIN: CPT | Mod: 95

## 2020-05-01 RX ORDER — DOXYCYCLINE HYCLATE 100 MG/1
100 CAPSULE ORAL
Qty: 14 | Refills: 0 | Status: DISCONTINUED | COMMUNITY
Start: 2020-03-13 | End: 2020-05-01

## 2020-05-01 RX ORDER — LOSARTAN POTASSIUM 100 MG/1
100 TABLET, FILM COATED ORAL
Qty: 90 | Refills: 1 | Status: DISCONTINUED | COMMUNITY
Start: 2019-10-28 | End: 2020-05-01

## 2020-05-01 RX ORDER — BUTALBITAL, ACETAMINOPHEN AND CAFFEINE 50; 325; 40 MG/1; MG/1; MG/1
50-325-40 CAPSULE ORAL
Qty: 120 | Refills: 0 | Status: DISCONTINUED | COMMUNITY
Start: 2018-11-21 | End: 2020-05-01

## 2020-05-01 RX ORDER — SILVER SULFADIAZINE 10 MG/G
1 CREAM TOPICAL TWICE DAILY
Qty: 1 | Refills: 0 | Status: DISCONTINUED | COMMUNITY
Start: 2019-11-21 | End: 2020-05-01

## 2020-05-01 NOTE — REVIEW OF SYSTEMS
[SOB on Exertion] : sob on exertion [Negative] : Genitourinary [Fever] : no fever [Fatigue] : no fatigue [Chills] : no chills [Poor Appetite] : no poor appetite [Cough] : no cough [Chest Tightness] : no chest tightness [Sputum] : no sputum [Wheezing] : no wheezing [Chest Discomfort] : no chest discomfort [Edema] : no edema [Palpitations] : no palpitations [TextBox_113] : HOdgkins  not active

## 2020-05-01 NOTE — PROCEDURE
[FreeTextEntry1] : \par X-ray PA lateral February 7, 2020\par Normal cardiac size\par No parenchymal infiltrate pleural effusions dominant pulmonary nodules\par Impression clear lungs\par \par Spirometry  11/19/2019\par  Normal  flow  rates\par  No BD  at  FEV!\par \par PFT 6/19/2019\par Normal Flow s Rates\par  12 Percent response to bronchodilator at FEV1\par Lung volumes normal\par  DLCO 80 %\par HGB14.3\par \par PET CT  No Evidence active disease July 2019\par  \par ECHO 1/2019\par  diastolic dysfx\par No reported PAP\par \par Urine  noted\par Spirometry 8/22/2019\par  without bronchodilator normal flow rates stable\par \par DTaP administration\par Completed to dose shingles Shingrix protocol\par \par Blood Draw\par Data review August 23, 2019\par CBC normal range\par TFTs normal\par Hemoglobin A1c 5.6% with mean plasma glucose 114\par Lipid profile\par Total cholesterol 135 HDL 35 LDL 69\par Triglycerides nonfasting 156\par Vitamin D 36.8\par Serum sodium 146\par Serum potassium 3.6\par BUN 16 creatinine 0.73\par Serum calcium 9.7\par Liver function testing normal

## 2020-05-01 NOTE — DISCUSSION/SUMMARY
[FreeTextEntry1] : \par Cardiology noted Dr Cordero\par  ECHO\par  event monitor\par  Carotid duplex \par \par PET CT per Oncology July 2019\par Trial singulair 10 mg\par  Flonase  2 sprays  nostril QD\par \par Patient is due for a PET scan as part of her overall management for Hodgkin's lymphoma but it is not emergent and I did advise she certainly because she will be more comfortable way to the latter part of the summer\par She will follow-up with hematology Polish with MD\par We discussed in detail serologic antibody testing for COVID-19 but she was advised that it is not urgent and she needs to continue to maintain all of the COVID-19 precautions including social distancing social avoiding social gatherings handwashing masking etc.\par She will notify if any change in symptomatology\par \par Stable KAN-  with  component deconditioning\par Hodgkins x 5 yr  remission\par ECHO 1/2019- diastolic dysfx\par PPM\par Flu  shot up to  date\par \par

## 2020-05-01 NOTE — HISTORY OF PRESENT ILLNESS
[Medical Office: ([facility])___] : at the medical office located in  [Home] : at home, [unfilled] , at the time of the visit. [Patient] : the patient [TextBox_4] : This visit was provided via telehealth using real-time 2-way audio visual technology. The patient, SCOTT TIAN , was located at home, [address]\[place] [address]  at the time of the visit.\par The provider, Dr. Benjie Koo, was located at office [address] [place] [address] at the time of the visit. \par \par  The patient, Ms. SCOTT TIAN  and Physician Benjie Koo  DO [facility] participated in the telehealth encounter.\par \par Verbal consent obtained by  from patient\par \par Is homebound\par Patient with Hodgkin's lymphoma in remission just about 5 years\par Mild airway disease\par Limited some chronic shortness of breath usually due to deconditioning\par No cough fevers chills sweats\par No chest pain chest congestion chest tightness\par No purulent sputum\par No exertional dyspnea or chest pain noted\par No lower extremity edema\par Does self blood pressure monitoring at home\par Reports BP today 137/80\par \par

## 2020-05-05 ENCOUNTER — RX RENEWAL (OUTPATIENT)
Age: 80
End: 2020-05-05

## 2020-07-02 ENCOUNTER — RX RENEWAL (OUTPATIENT)
Age: 80
End: 2020-07-02

## 2020-07-15 ENCOUNTER — APPOINTMENT (OUTPATIENT)
Dept: CARDIOLOGY | Facility: CLINIC | Age: 80
End: 2020-07-15
Payer: MEDICARE

## 2020-07-15 ENCOUNTER — NON-APPOINTMENT (OUTPATIENT)
Age: 80
End: 2020-07-15

## 2020-07-15 VITALS
WEIGHT: 185 LBS | SYSTOLIC BLOOD PRESSURE: 127 MMHG | HEART RATE: 77 BPM | BODY MASS INDEX: 30.82 KG/M2 | HEIGHT: 65 IN | DIASTOLIC BLOOD PRESSURE: 79 MMHG | OXYGEN SATURATION: 96 %

## 2020-07-15 PROCEDURE — 99214 OFFICE O/P EST MOD 30 MIN: CPT

## 2020-07-15 PROCEDURE — 93000 ELECTROCARDIOGRAM COMPLETE: CPT

## 2020-07-15 RX ORDER — ATORVASTATIN CALCIUM 80 MG/1
80 TABLET, FILM COATED ORAL
Qty: 90 | Refills: 0 | Status: DISCONTINUED | COMMUNITY
Start: 2017-11-07 | End: 2020-07-15

## 2020-07-30 LAB — SARS-COV-2 N GENE NPH QL NAA+PROBE: NOT DETECTED

## 2020-08-03 ENCOUNTER — APPOINTMENT (OUTPATIENT)
Dept: PULMONOLOGY | Facility: CLINIC | Age: 80
End: 2020-08-03
Payer: MEDICARE

## 2020-08-03 PROCEDURE — 94060 EVALUATION OF WHEEZING: CPT

## 2020-08-03 PROCEDURE — 99214 OFFICE O/P EST MOD 30 MIN: CPT | Mod: 25

## 2020-08-03 PROCEDURE — 94729 DIFFUSING CAPACITY: CPT

## 2020-08-03 PROCEDURE — 94618 PULMONARY STRESS TESTING: CPT

## 2020-08-03 PROCEDURE — 94727 GAS DIL/WSHOT DETER LNG VOL: CPT

## 2020-08-03 NOTE — DISCUSSION/SUMMARY
[FreeTextEntry1] : \par Cardiology noted Dr Cordero\par  ECHO\par  event monitor\par  Carotid duplex \par \par PET CT per Oncology July 2019\par Trial singulair 10 mg\par  Flonase  2 sprays beach nostril QD\par \par Stable KAN-  with  component deconditioning\par Hodgkins x 5 yr  remission\par ECHO 1/2019- diastolic dysfx\par PPM\par Flu  shot up to  date\par Walking exercise advised and follow-up 3 months

## 2020-08-03 NOTE — PROCEDURE
[FreeTextEntry1] : PET scan June 2020- for evidence of recurrent disease\par \par PFT August 3, 2020\par Flow rates normal with normal spirometry\par Total lung capacity normal 80% of predicted.\par Diffusion 79% predicted\par Hemoglobin 13.8.\par \par Pulmonary 6-minute walk step test August 3, 2020\par Total steps 120\par Room air Baseline room air O2 saturation 98%\par With increased maximum heart rate to 1 10–1 12 Aidan desaturation 94 to 95%\par Impression normal study\par Negative exertional hypoxemia\par \par X-ray PA lateral February 7, 2020\par Normal cardiac size\par No parenchymal infiltrate pleural effusions dominant pulmonary nodules\par Impression clear lungs\par \par Spirometry  11/19/2019\par  Normal  flow  rates\par  No BD  at  FEV!\par \par PFT 6/19/2019\par Normal Flow s Rates\par  12 Percent response to bronchodilator at FEV1\par Lung volumes normal\par  DLCO 80 %\par HGB14.3\par \par PET CT  No Evidence active disease July 2019\par  \par ECHO 1/2019\par  diastolic dysfx\par No reported PAP\par \par Urine  noted\par Spirometry 8/22/2019\par  without bronchodilator normal flow rates stable\par \par DTaP administration\par Completed to dose shingles Shingrix protocol\par \par Blood Draw\par Data review August 23, 2019\par CBC normal range\par TFTs normal\par Hemoglobin A1c 5.6% with mean plasma glucose 114\par Lipid profile\par Total cholesterol 135 HDL 35 LDL 69\par Triglycerides nonfasting 156\par Vitamin D 36.8\par Serum sodium 146\par Serum potassium 3.6\par BUN 16 creatinine 0.73\par Serum calcium 9.7\par Liver function testing normal

## 2020-08-03 NOTE — REVIEW OF SYSTEMS
[As Noted in HPI] : as noted in HPI [Back Pain] : ~T back pain [Arthralgias] : arthralgias [Myalgias] : myalgias [Negative] : Pulmonary Hypertension [Trauma] : no ~T physical trauma [Fracture] : no fracture [Kyphoscoliosis] : no kyphoscoliosis [FreeTextEntry9] : HARMONY BLACKWELLD

## 2020-08-03 NOTE — PHYSICAL EXAM
[General Appearance - Well Developed] : well developed [Well Groomed] : well groomed [Normal Appearance] : normal appearance [General Appearance - Well Nourished] : well nourished [No Deformities] : no deformities [Normal Conjunctiva] : the conjunctiva exhibited no abnormalities [General Appearance - In No Acute Distress] : no acute distress [Normal Oropharynx] : normal oropharynx [Eyelids - No Xanthelasma] : the eyelids demonstrated no xanthelasmas [II] : II [Neck Cervical Mass (___cm)] : no neck mass was observed [Neck Appearance] : the appearance of the neck was normal [Jugular Venous Distention Increased] : there was no jugular-venous distention [Thyroid Diffuse Enlargement] : the thyroid was not enlarged [Thyroid Nodule] : there were no palpable thyroid nodules [Heart Sounds] : normal S1 and S2 [Heart Rate And Rhythm] : heart rate and rhythm were normal [Murmurs] : no murmurs present [Arterial Pulses Normal] : the arterial pulses were normal [Edema] : no peripheral edema present [Veins - Varicosity Changes] : no varicosital changes were noted in the lower extremities [Respiration, Rhythm And Depth] : normal respiratory rhythm and effort [Auscultation Breath Sounds / Voice Sounds] : lungs were clear to auscultation bilaterally [Exaggerated Use Of Accessory Muscles For Inspiration] : no accessory muscle use [Chest Palpation] : palpation of the chest revealed no abnormalities [Lungs Percussion] : the lungs were normal to percussion [Bowel Sounds] : normal bowel sounds [Abdomen Tenderness] : non-tender [Abdomen Soft] : soft [Abdomen Mass (___ Cm)] : no abdominal mass palpated [Gait - Sufficient For Exercise Testing] : the gait was sufficient for exercise testing [Abnormal Walk] : normal gait [Cyanosis, Localized] : no localized cyanosis [Nail Clubbing] : no clubbing of the fingernails [Petechial Hemorrhages (___cm)] : no petechial hemorrhages [] : no ischemic changes [Sensation] : the sensory exam was normal to light touch and pinprick [Deep Tendon Reflexes (DTR)] : deep tendon reflexes were 2+ and symmetric [No Focal Deficits] : no focal deficits [Impaired Insight] : insight and judgment were intact [Oriented To Time, Place, And Person] : oriented to person, place, and time [Affect] : the affect was normal [FreeTextEntry1] : rigfht hand erythma burn  first  degreee no evidence pustules cysts or  drainage

## 2020-08-03 NOTE — HISTORY OF PRESENT ILLNESS
[Stable] : are stable [Difficulty Breathing During Exertion] : stable dyspnea on exertion [Feelings Of Weakness On Exertion] : stable exercise intolerance [Cough] : denies coughing [Wheezing] : denies wheezing [Regional Soft Tissue Swelling Both Lower Extremities] : denies lower extremity edema [Chest Pain Or Discomfort] : denies chest pain [Fever] : denies fever [Obstructive Sleep Apnea] : obstructive sleep apnea [Date: ___] : Date of most recent diagnostic polysomnogram: [unfilled] [AHI: ___ per hour] : Apnea-hypopnea index:  [unfilled] per hour [Wt Gain ___ Lbs] : no recent weight gain [Oxygen] : the patient uses no supplemental oxygen [FreeTextEntry1] : PET Scan reviewed\par \par Cardiology reviewed and noted\par Patient states up-to-date with hematology oncology for which was MD\par

## 2020-10-25 ENCOUNTER — RX RENEWAL (OUTPATIENT)
Age: 80
End: 2020-10-25

## 2020-11-05 LAB — SARS-COV-2 N GENE NPH QL NAA+PROBE: NOT DETECTED

## 2020-11-09 ENCOUNTER — APPOINTMENT (OUTPATIENT)
Dept: PULMONOLOGY | Facility: CLINIC | Age: 80
End: 2020-11-09
Payer: MEDICARE

## 2020-11-09 VITALS
HEART RATE: 81 BPM | OXYGEN SATURATION: 98 % | RESPIRATION RATE: 16 BRPM | BODY MASS INDEX: 30.82 KG/M2 | WEIGHT: 185 LBS | HEIGHT: 65 IN | TEMPERATURE: 96.3 F

## 2020-11-09 VITALS
TEMPERATURE: 97.7 F | HEART RATE: 80 BPM | RESPIRATION RATE: 16 BRPM | DIASTOLIC BLOOD PRESSURE: 74 MMHG | OXYGEN SATURATION: 95 % | SYSTOLIC BLOOD PRESSURE: 118 MMHG

## 2020-11-09 LAB — POCT - HEMOGLOBIN (HGB), QUANTITATIVE, TRANSCUTANEOUS: 13.5

## 2020-11-09 PROCEDURE — 94727 GAS DIL/WSHOT DETER LNG VOL: CPT

## 2020-11-09 PROCEDURE — 94729 DIFFUSING CAPACITY: CPT

## 2020-11-09 PROCEDURE — 99214 OFFICE O/P EST MOD 30 MIN: CPT | Mod: CS,25

## 2020-11-09 PROCEDURE — 88738 HGB QUANT TRANSCUTANEOUS: CPT

## 2020-11-09 PROCEDURE — ZZZZZ: CPT

## 2020-11-09 PROCEDURE — 94010 BREATHING CAPACITY TEST: CPT

## 2020-11-09 NOTE — DISCUSSION/SUMMARY
[FreeTextEntry1] : \par Cardiology noted Dr Cordero\par  ECHO\par  event monitor\par  Carotid duplex \par \par PET CT per Oncology July 2019\par Trial singulair 10 mg\par  Flonase  2 sprays beach nostril QD\par \par Stable KAN-  with  component deconditioning\par Hodgkins x 5 yr  remission\par ECHO 1/2019- diastolic dysfx\par PPM\par Flu  shot up to  date/ pneumonia up  to  date\par Walking exercise advised and follow-up 3 months

## 2020-11-09 NOTE — PHYSICAL EXAM
[General Appearance - Well Developed] : well developed [Normal Appearance] : normal appearance [Well Groomed] : well groomed [General Appearance - Well Nourished] : well nourished [No Deformities] : no deformities [General Appearance - In No Acute Distress] : no acute distress [Normal Conjunctiva] : the conjunctiva exhibited no abnormalities [Eyelids - No Xanthelasma] : the eyelids demonstrated no xanthelasmas [Normal Oropharynx] : normal oropharynx [II] : II [Neck Appearance] : the appearance of the neck was normal [Neck Cervical Mass (___cm)] : no neck mass was observed [Jugular Venous Distention Increased] : there was no jugular-venous distention [Thyroid Diffuse Enlargement] : the thyroid was not enlarged [Thyroid Nodule] : there were no palpable thyroid nodules [Heart Rate And Rhythm] : heart rate and rhythm were normal [Heart Sounds] : normal S1 and S2 [Murmurs] : no murmurs present [Arterial Pulses Normal] : the arterial pulses were normal [Edema] : no peripheral edema present [Veins - Varicosity Changes] : no varicosital changes were noted in the lower extremities [Respiration, Rhythm And Depth] : normal respiratory rhythm and effort [Exaggerated Use Of Accessory Muscles For Inspiration] : no accessory muscle use [Auscultation Breath Sounds / Voice Sounds] : lungs were clear to auscultation bilaterally [Chest Palpation] : palpation of the chest revealed no abnormalities [Lungs Percussion] : the lungs were normal to percussion [Bowel Sounds] : normal bowel sounds [Abdomen Soft] : soft [Abdomen Tenderness] : non-tender [Abdomen Mass (___ Cm)] : no abdominal mass palpated [Abnormal Walk] : normal gait [Gait - Sufficient For Exercise Testing] : the gait was sufficient for exercise testing [Nail Clubbing] : no clubbing of the fingernails [Cyanosis, Localized] : no localized cyanosis [Petechial Hemorrhages (___cm)] : no petechial hemorrhages [] : no ischemic changes [FreeTextEntry1] : rigfht hand erythma burn  first  degreee no evidence pustules cysts or  drainage [Deep Tendon Reflexes (DTR)] : deep tendon reflexes were 2+ and symmetric [Sensation] : the sensory exam was normal to light touch and pinprick [No Focal Deficits] : no focal deficits [Oriented To Time, Place, And Person] : oriented to person, place, and time [Impaired Insight] : insight and judgment were intact [Affect] : the affect was normal

## 2020-11-09 NOTE — HISTORY OF PRESENT ILLNESS
[Stable] : are stable [Difficulty Breathing During Exertion] : stable dyspnea on exertion [Feelings Of Weakness On Exertion] : stable exercise intolerance [Cough] : denies coughing [Wheezing] : denies wheezing [Regional Soft Tissue Swelling Both Lower Extremities] : denies lower extremity edema [Chest Pain Or Discomfort] : denies chest pain [Fever] : denies fever [Wt Gain ___ Lbs] : no recent weight gain [Oxygen] : the patient uses no supplemental oxygen [Obstructive Sleep Apnea] : obstructive sleep apnea [Date: ___] : Date of most recent diagnostic polysomnogram: [unfilled] [AHI: ___ per hour] : Apnea-hypopnea index:  [unfilled] per hour [FreeTextEntry1] : PET Scan reviewed\par \par Cardiology reviewed and noted\par Patient states up-to-date with hematology oncology for which was MD\par \par KAN stable no decline

## 2020-11-09 NOTE — PROCEDURE
[FreeTextEntry1] : PFT Nov 9 2020\par normal flow  rates minimnal OAD \par normal lung volumes\par  Very mild / low nl DLCO  7y3 % pred\par HGB 13.5\par \par PET scan June 2020- for evidence of recurrent disease\par \par PFT August 3, 2020\par Flow rates normal with normal spirometry\par Total lung capacity normal 80% of predicted.\par Diffusion 79% predicted\par Hemoglobin 13.8.\par \par Pulmonary 6-minute walk step test August 3, 2020\par Total steps 120\par Room air Baseline room air O2 saturation 98%\par With increased maximum heart rate to 1 10–1 12 Aidan desaturation 94 to 95%\par Impression normal study\par Negative exertional hypoxemia\par \par X-ray PA lateral February 7, 2020\par Normal cardiac size\par No parenchymal infiltrate pleural effusions dominant pulmonary nodules\par Impression clear lungs\par \par Spirometry  11/19/2019\par  Normal  flow  rates\par  No BD  at  FEV!\par \par PFT 6/19/2019\par Normal Flow s Rates\par  12 Percent response to bronchodilator at FEV1\par Lung volumes normal\par  DLCO 80 %\par HGB14.3\par \par PET CT  No Evidence active disease July 2019\par  \par ECHO 1/2019\par  diastolic dysfx\par No reported PAP\par \par Urine  noted\par Spirometry 8/22/2019\par  without bronchodilator normal flow rates stable\par \par DTaP administration\par Completed to dose shingles Shingrix protocol\par \par Blood Draw\par Data review August 23, 2019\par CBC normal range\par TFTs normal\par Hemoglobin A1c 5.6% with mean plasma glucose 114\par Lipid profile\par Total cholesterol 135 HDL 35 LDL 69\par Triglycerides nonfasting 156\par Vitamin D 36.8\par Serum sodium 146\par Serum potassium 3.6\par BUN 16 creatinine 0.73\par Serum calcium 9.7\par Liver function testing normal

## 2020-11-29 ENCOUNTER — TRANSCRIPTION ENCOUNTER (OUTPATIENT)
Age: 80
End: 2020-11-29

## 2020-11-30 ENCOUNTER — APPOINTMENT (OUTPATIENT)
Dept: PULMONOLOGY | Facility: CLINIC | Age: 80
End: 2020-11-30
Payer: MEDICARE

## 2020-11-30 VITALS
TEMPERATURE: 98.2 F | SYSTOLIC BLOOD PRESSURE: 146 MMHG | HEART RATE: 87 BPM | OXYGEN SATURATION: 94 % | DIASTOLIC BLOOD PRESSURE: 81 MMHG

## 2020-11-30 DIAGNOSIS — L03.012 CELLULITIS OF LEFT FINGER: ICD-10-CM

## 2020-11-30 PROCEDURE — 99214 OFFICE O/P EST MOD 30 MIN: CPT

## 2020-11-30 NOTE — PROCEDURE
[FreeTextEntry1] : PFT Nov 9 2020\par normal flow  rates minimal OAD \par normal lung volumes\par  Very mild / low nl DLCO  73 % pred\par HGB 13.5\par \par PET scan June 2020- for evidence of recurrent disease\par \par PFT August 3, 2020\par Flow rates normal with normal spirometry\par Total lung capacity normal 80% of predicted.\par Diffusion 79% predicted\par Hemoglobin 13.8.\par \par Pulmonary 6-minute walk step test August 3, 2020\par Total steps 120\par Room air Baseline room air O2 saturation 98%\par With increased maximum heart rate to 1 10–1 12 Aidan desaturation 94 to 95%\par Impression normal study\par Negative exertional hypoxemia\par \par X-ray PA lateral February 7, 2020\par Normal cardiac size\par No parenchymal infiltrate pleural effusions dominant pulmonary nodules\par Impression clear lungs\par \par Spirometry  11/19/2019\par  Normal  flow  rates\par  No BD  at  FEV!\par \par PFT 6/19/2019\par Normal Flow s Rates\par  12 Percent response to bronchodilator at FEV1\par Lung volumes normal\par  DLCO 80 %\par HGB14.3\par \par PET CT  No Evidence active disease July 2019\par  \par ECHO 1/2019\par  diastolic dysfx\par No reported PAP\par \par Urine  noted\par Spirometry 8/22/2019\par  without bronchodilator normal flow rates stable\par \par DTaP administration\par Completed to dose shingles Shingrix protocol\par \par Blood Draw\par Data review August 23, 2019\par CBC normal range\par TFTs normal\par Hemoglobin A1c 5.6% with mean plasma glucose 114\par Lipid profile\par Total cholesterol 135 HDL 35 LDL 69\par Triglycerides nonfasting 156\par Vitamin D 36.8\par Serum sodium 146\par Serum potassium 3.6\par BUN 16 creatinine 0.73\par Serum calcium 9.7\par Liver function testing normal

## 2020-11-30 NOTE — DISCUSSION/SUMMARY
[FreeTextEntry1] : \par Cardiology noted Dr Cordero\par  ECHO\par  event monitor\par  Carotid duplex \par \par PET CT per Oncology July 2019\par Trial singulair 10 mg\par  Flonase  2 sprays beach nostril QD\par \par Stable KAN-  with  component deconditioning\par Hodgkins x 5 yr  remission\par ECHO 1/2019- diastolic dysfx\par PPM\par Flu  shot up to  date/ pneumonia up  to  date\par Walking exercise advised and follow-up 3 months\par \par Paronychia middle finger left hand  at pt request change Keflex - Bactrim \par warm soaks , hand if progression post tx at urgent care

## 2020-11-30 NOTE — HISTORY OF PRESENT ILLNESS
[Stable] : are stable [Difficulty Breathing During Exertion] : stable dyspnea on exertion [Feelings Of Weakness On Exertion] : stable exercise intolerance [Cough] : denies coughing [Wheezing] : denies wheezing [Regional Soft Tissue Swelling Both Lower Extremities] : denies lower extremity edema [Chest Pain Or Discomfort] : denies chest pain [Fever] : denies fever [Obstructive Sleep Apnea] : obstructive sleep apnea [Date: ___] : Date of most recent diagnostic polysomnogram: [unfilled] [AHI: ___ per hour] : Apnea-hypopnea index:  [unfilled] per hour [Wt Gain ___ Lbs] : no recent weight gain [Oxygen] : the patient uses no supplemental oxygen [FreeTextEntry1] : PET Scan reviewed\par \par Cardiology reviewed and noted\par Patient states up-to-date with hematology oncology for which was MD\par \par KAN stable no decline

## 2021-01-24 ENCOUNTER — RX RENEWAL (OUTPATIENT)
Age: 81
End: 2021-01-24

## 2021-02-05 DIAGNOSIS — Z20.822 CONTACT WITH AND (SUSPECTED) EXPOSURE TO COVID-19: ICD-10-CM

## 2021-02-08 ENCOUNTER — APPOINTMENT (OUTPATIENT)
Dept: PULMONOLOGY | Facility: CLINIC | Age: 81
End: 2021-02-08

## 2021-02-18 ENCOUNTER — APPOINTMENT (OUTPATIENT)
Dept: CARDIOLOGY | Facility: CLINIC | Age: 81
End: 2021-02-18

## 2021-03-12 LAB — SARS-COV-2 N GENE NPH QL NAA+PROBE: NOT DETECTED

## 2021-03-15 ENCOUNTER — APPOINTMENT (OUTPATIENT)
Dept: PULMONOLOGY | Facility: CLINIC | Age: 81
End: 2021-03-15
Payer: MEDICARE

## 2021-03-15 VITALS
DIASTOLIC BLOOD PRESSURE: 84 MMHG | SYSTOLIC BLOOD PRESSURE: 127 MMHG | OXYGEN SATURATION: 97 % | HEIGHT: 65 IN | RESPIRATION RATE: 16 BRPM | WEIGHT: 185 LBS | HEART RATE: 83 BPM | BODY MASS INDEX: 30.82 KG/M2

## 2021-03-15 LAB — POCT - HEMOGLOBIN (HGB), QUANTITATIVE, TRANSCUTANEOUS: 14

## 2021-03-15 PROCEDURE — 88738 HGB QUANT TRANSCUTANEOUS: CPT

## 2021-03-15 PROCEDURE — 94010 BREATHING CAPACITY TEST: CPT

## 2021-03-15 PROCEDURE — 94727 GAS DIL/WSHOT DETER LNG VOL: CPT

## 2021-03-15 PROCEDURE — ZZZZZ: CPT

## 2021-03-15 PROCEDURE — 94729 DIFFUSING CAPACITY: CPT

## 2021-03-15 PROCEDURE — 95012 NITRIC OXIDE EXP GAS DETER: CPT

## 2021-03-15 PROCEDURE — 71046 X-RAY EXAM CHEST 2 VIEWS: CPT

## 2021-03-15 PROCEDURE — 99213 OFFICE O/P EST LOW 20 MIN: CPT | Mod: CS,25

## 2021-03-15 NOTE — DISCUSSION/SUMMARY
[FreeTextEntry1] : \par Cardiology noted Dr Cordero\par  ECHO\par  event monitor\par  Carotid duplex \par \par PET CT per Oncology July 2019\par Trial singulair 10 mg\par  Flonase  2 sprays beach nostril QD\par \par Stable KAN-  with  component deconditioning\par Hodgkins x 5 yr  remission\par ECHO 1/2019- diastolic dysfx\par PPM\par Flu  shot up to  date/ pneumonia up  to  date\par Walking exercise advised and follow-up 3 months\par \par PFIZER COVID vaccine  completed  2 dose protocol\par \par Schedule well visit at follow-up\par  mammogram August 2021\par Timing bone density\par

## 2021-03-15 NOTE — PROCEDURE
[FreeTextEntry1] : PFT 3/15/21\par normal flow rates\par Lung Volumes  normal\par  DLCO  69 % mild  reduction\par HGB 14.0\par \par X-ray PA lateral March 15, 2021\par Cardiac size normal\par No parenchymal infiltrate pulmonary opacities pulmonary consolidation dominant pulmonary nodules pleural effusion pneumothorax\par Soft tissue bony structures unremarkable\par Lucina mediastinum grossly unremarkable\par Impression clear lungs\par Comparison to February 7, 2020 without interval change\par \par PFT Nov 9 2020\par normal flow  rates minimal OAD \par normal lung volumes\par  Very mild / low nl DLCO  73 % pred\par HGB 13.5\par \par PET scan June 2020- for evidence of recurrent disease\par \par PFT August 3, 2020\par Flow rates normal with normal spirometry\par Total lung capacity normal 80% of predicted.\par Diffusion 79% predicted\par Hemoglobin 13.8.\par \par Pulmonary 6-minute walk step test August 3, 2020\par Total steps 120\par Room air Baseline room air O2 saturation 98%\par With increased maximum heart rate to 1 10–1 12 Aidan desaturation 94 to 95%\par Impression normal study\par Negative exertional hypoxemia\par \par X-ray PA lateral February 7, 2020\par Normal cardiac size\par No parenchymal infiltrate pleural effusions dominant pulmonary nodules\par Impression clear lungs\par \par Spirometry  11/19/2019\par  Normal  flow  rates\par  No BD  at  FEV!\par \par PFT 6/19/2019\par Normal Flow s Rates\par  12 Percent response to bronchodilator at FEV1\par Lung volumes normal\par  DLCO 80 %\par HGB14.3\par \par PET CT  No Evidence active disease July 2019\par  \par ECHO 1/2019\par  diastolic dysfx\par No reported PAP\par \par Urine  noted\par Spirometry 8/22/2019\par  without bronchodilator normal flow rates stable\par \par DTaP administration\par Completed to dose shingles Shingrix protocol\par \par Blood Draw\par Data review August 23, 2019\par CBC normal range\par TFTs normal\par Hemoglobin A1c 5.6% with mean plasma glucose 114\par Lipid profile\par Total cholesterol 135 HDL 35 LDL 69\par Triglycerides nonfasting 156\par Vitamin D 36.8\par Serum sodium 146\par Serum potassium 3.6\par BUN 16 creatinine 0.73\par Serum calcium 9.7\par Liver function testing normal

## 2021-04-15 ENCOUNTER — NON-APPOINTMENT (OUTPATIENT)
Age: 81
End: 2021-04-15

## 2021-04-15 ENCOUNTER — APPOINTMENT (OUTPATIENT)
Dept: CARDIOLOGY | Facility: CLINIC | Age: 81
End: 2021-04-15
Payer: MEDICARE

## 2021-04-15 VITALS — SYSTOLIC BLOOD PRESSURE: 138 MMHG | HEART RATE: 78 BPM | DIASTOLIC BLOOD PRESSURE: 81 MMHG | OXYGEN SATURATION: 98 %

## 2021-04-15 DIAGNOSIS — I65.29 OCCLUSION AND STENOSIS OF UNSPECIFIED CAROTID ARTERY: ICD-10-CM

## 2021-04-15 PROCEDURE — 99214 OFFICE O/P EST MOD 30 MIN: CPT

## 2021-04-15 PROCEDURE — 93000 ELECTROCARDIOGRAM COMPLETE: CPT

## 2021-05-02 ENCOUNTER — RX RENEWAL (OUTPATIENT)
Age: 81
End: 2021-05-02

## 2021-05-07 ENCOUNTER — APPOINTMENT (OUTPATIENT)
Dept: CARDIOLOGY | Facility: CLINIC | Age: 81
End: 2021-05-07
Payer: MEDICARE

## 2021-05-07 PROCEDURE — 93306 TTE W/DOPPLER COMPLETE: CPT

## 2021-05-07 PROCEDURE — 93880 EXTRACRANIAL BILAT STUDY: CPT

## 2021-05-10 NOTE — HISTORY OF PRESENT ILLNESS
-- DO NOT REPLY / DO NOT REPLY ALL --  -- Message is from the [facility]--    COVID-19 Universal Screening: N/A - Not about scheduling    General Patient Message      Reason for Call: Patient is requesting a call back. Patient was released on 04/30/2021 after a gall bladder procedure and has questions about gall bladder surgery.    Caller Information       Type Contact Phone    05/10/2021 08:32 AM CDT Phone (Incoming) Cyndie Doty (Self) [phone] (H)          Alternative phone number: None    Turnaround time given to caller:   \"This message will be sent to [state Provider's name]. The clinical team will fulfill your request as soon as they review your message.\"     [Stable] : are stable [Difficulty Breathing During Exertion] : stable dyspnea on exertion [Feelings Of Weakness On Exertion] : stable exercise intolerance [Cough] : denies coughing [Wheezing] : denies wheezing [Regional Soft Tissue Swelling Both Lower Extremities] : denies lower extremity edema [Chest Pain Or Discomfort] : denies chest pain [Fever] : denies fever [Obstructive Sleep Apnea] : obstructive sleep apnea [Date: ___] : Date of most recent diagnostic polysomnogram: [unfilled] [AHI: ___ per hour] : Apnea-hypopnea index:  [unfilled] per hour [Wt Gain ___ Lbs] : no recent weight gain [Oxygen] : the patient uses no supplemental oxygen [FreeTextEntry1] : stable KAN\par no cough wheeze sputum\par  s/p PET CT -reviewed and no evidence for recurrent disase

## 2021-06-09 ENCOUNTER — NON-APPOINTMENT (OUTPATIENT)
Age: 81
End: 2021-06-09

## 2021-06-11 LAB — SARS-COV-2 N GENE NPH QL NAA+PROBE: NOT DETECTED

## 2021-06-14 ENCOUNTER — NON-APPOINTMENT (OUTPATIENT)
Age: 81
End: 2021-06-14

## 2021-06-14 ENCOUNTER — APPOINTMENT (OUTPATIENT)
Dept: PULMONOLOGY | Facility: CLINIC | Age: 81
End: 2021-06-14
Payer: MEDICARE

## 2021-06-14 VITALS
OXYGEN SATURATION: 94 % | HEIGHT: 65 IN | BODY MASS INDEX: 30.82 KG/M2 | HEART RATE: 78 BPM | SYSTOLIC BLOOD PRESSURE: 141 MMHG | TEMPERATURE: 97 F | WEIGHT: 185 LBS | DIASTOLIC BLOOD PRESSURE: 84 MMHG

## 2021-06-14 DIAGNOSIS — R42 DIZZINESS AND GIDDINESS: ICD-10-CM

## 2021-06-14 DIAGNOSIS — R92.2 INCONCLUSIVE MAMMOGRAM: ICD-10-CM

## 2021-06-14 DIAGNOSIS — Z00.00 ENCOUNTER FOR GENERAL ADULT MEDICAL EXAMINATION W/OUT ABNORMAL FINDINGS: ICD-10-CM

## 2021-06-14 LAB
ALBUMIN SERPL ELPH-MCNC: 4.7 G/DL
ALBUMIN: 30
ALP BLD-CCNC: 77 U/L
ALT SERPL-CCNC: 14 U/L
ANION GAP SERPL CALC-SCNC: 13 MMOL/L
AST SERPL-CCNC: 19 U/L
BASOPHILS # BLD AUTO: 0.02 K/UL
BASOPHILS NFR BLD AUTO: 0.3 %
BILIRUB SERPL-MCNC: 0.4 MG/DL
BILIRUB UR QL STRIP: NORMAL
BUN SERPL-MCNC: 17 MG/DL
CALCIUM SERPL-MCNC: 10.2 MG/DL
CHLORIDE SERPL-SCNC: 104 MMOL/L
CLARITY UR: CLEAR
CO2 SERPL-SCNC: 26 MMOL/L
COLLECTION METHOD: NORMAL
CREAT SERPL-MCNC: 0.7 MG/DL
CREATININE: 200
EOSINOPHIL # BLD AUTO: 0.03 K/UL
EOSINOPHIL NFR BLD AUTO: 0.4 %
GLUCOSE SERPL-MCNC: 108 MG/DL
GLUCOSE UR-MCNC: NORMAL
HCG UR QL: 0.2 EU/DL
HCT VFR BLD CALC: 40.9 %
HGB BLD-MCNC: 13.7 G/DL
HGB UR QL STRIP.AUTO: NORMAL
IMM GRANULOCYTES NFR BLD AUTO: 0.3 %
KETONES UR-MCNC: NORMAL
LEUKOCYTE ESTERASE UR QL STRIP: NORMAL
LYMPHOCYTES # BLD AUTO: 0.82 K/UL
LYMPHOCYTES NFR BLD AUTO: 12.1 %
MAN DIFF?: NORMAL
MCHC RBC-ENTMCNC: 31.6 PG
MCHC RBC-ENTMCNC: 33.5 GM/DL
MCV RBC AUTO: 94.2 FL
MICROALBUMIN/CREAT UR TEST STR-RTO: 30
MONOCYTES # BLD AUTO: 0.31 K/UL
MONOCYTES NFR BLD AUTO: 4.6 %
NEUTROPHILS # BLD AUTO: 5.58 K/UL
NEUTROPHILS NFR BLD AUTO: 82.3 %
NITRITE UR QL STRIP: NORMAL
PH UR STRIP: 6
PLATELET # BLD AUTO: 194 K/UL
POTASSIUM SERPL-SCNC: 3.9 MMOL/L
PROT SERPL-MCNC: 6.6 G/DL
PROT UR STRIP-MCNC: NORMAL
RBC # BLD: 4.34 M/UL
RBC # FLD: 13 %
SODIUM SERPL-SCNC: 144 MMOL/L
SP GR UR STRIP: 1.02
T3 SERPL-MCNC: 141 NG/DL
T4 SERPL-MCNC: 8.6 UG/DL
TSH SERPL-ACNC: 2.4 UIU/ML
WBC # FLD AUTO: 6.78 K/UL

## 2021-06-14 PROCEDURE — 82044 UR ALBUMIN SEMIQUANTITATIVE: CPT | Mod: QW

## 2021-06-14 PROCEDURE — 93000 ELECTROCARDIOGRAM COMPLETE: CPT

## 2021-06-14 PROCEDURE — 36415 COLL VENOUS BLD VENIPUNCTURE: CPT

## 2021-06-14 PROCEDURE — 81003 URINALYSIS AUTO W/O SCOPE: CPT | Mod: QW

## 2021-06-14 PROCEDURE — 99214 OFFICE O/P EST MOD 30 MIN: CPT | Mod: 25

## 2021-06-14 PROCEDURE — 71046 X-RAY EXAM CHEST 2 VIEWS: CPT

## 2021-06-14 NOTE — PROCEDURE
[FreeTextEntry1] : Chest x-ray PA lateral June 14, 2021\par Cardiac size normal\par Lung fields are clear\par Lucina mediastinum unremarkable\par Soft tissue bony structures unremarkable\par No parenchymal infiltrates pleural effusions with dominant pulmonary nodules\par Impression clear lungs\par EKG 6/14/21\par NSR\par \par PFT 3/15/21\par normal flow rates\par Lung Volumes  normal\par  DLCO  69 % mild  reduction\par HGB 14.0\par \par X-ray PA lateral March 15, 2021\par Cardiac size normal\par No parenchymal infiltrate pulmonary opacities pulmonary consolidation dominant pulmonary nodules pleural effusion pneumothorax\par Soft tissue bony structures unremarkable\par Lucina mediastinum grossly unremarkable\par Impression clear lungs\par Comparison to February 7, 2020 without interval change\par \par PFT Nov 9 2020\par normal flow  rates minimal OAD \par normal lung volumes\par  Very mild / low nl DLCO  73 % pred\par HGB 13.5\par \par PET scan June 2020- for evidence of recurrent disease\par \par PFT August 3, 2020\par Flow rates normal with normal spirometry\par Total lung capacity normal 80% of predicted.\par Diffusion 79% predicted\par Hemoglobin 13.8.\par \par Pulmonary 6-minute walk step test August 3, 2020\par Total steps 120\par Room air Baseline room air O2 saturation 98%\par With increased maximum heart rate to 1 10–1 12 Aidan desaturation 94 to 95%\par Impression normal study\par Negative exertional hypoxemia\par \par X-ray PA lateral February 7, 2020\par Normal cardiac size\par No parenchymal infiltrate pleural effusions dominant pulmonary nodules\par Impression clear lungs\par \par Spirometry  11/19/2019\par  Normal  flow  rates\par  No BD  at  FEV!\par \par PFT 6/19/2019\par Normal Flow s Rates\par  12 Percent response to bronchodilator at FEV1\par Lung volumes normal\par  DLCO 80 %\par HGB14.3\par \par PET CT  No Evidence active disease July 2019\par  \par ECHO 1/2019\par  diastolic dysfx\par No reported PAP\par \par Urine  noted\par Spirometry 8/22/2019\par  without bronchodilator normal flow rates stable\par \par DTaP administration\par Completed to dose shingles Shingrix protocol\par \par Blood Draw\par Data review August 23, 2019\par CBC normal range\par TFTs normal\par Hemoglobin A1c 5.6% with mean plasma glucose 114\par Lipid profile\par Total cholesterol 135 HDL 35 LDL 69\par Triglycerides nonfasting 156\par Vitamin D 36.8\par Serum sodium 146\par Serum potassium 3.6\par BUN 16 creatinine 0.73\par Serum calcium 9.7\par Liver function testing normal

## 2021-06-14 NOTE — HISTORY OF PRESENT ILLNESS
[Stable] : are stable [Difficulty Breathing During Exertion] : stable dyspnea on exertion [Feelings Of Weakness On Exertion] : stable exercise intolerance [Cough] : denies coughing [Wheezing] : denies wheezing [Regional Soft Tissue Swelling Both Lower Extremities] : denies lower extremity edema [Fever] : denies fever [Chest Pain Or Discomfort] : denies chest pain [Obstructive Sleep Apnea] : obstructive sleep apnea [Date: ___] : Date of most recent diagnostic polysomnogram: [unfilled] [AHI: ___ per hour] : Apnea-hypopnea index:  [unfilled] per hour [Wt Gain ___ Lbs] : no recent weight gain [Oxygen] : the patient uses no supplemental oxygen [FreeTextEntry1] : PET Scan reviewed\par \par Cardiology reviewed and noted\par Patient states up-to-date with hematology oncology for which was MD\par \par KAN stable no decline

## 2021-06-14 NOTE — PHYSICAL EXAM
[General Appearance - Well Developed] : well developed [Normal Appearance] : normal appearance [Well Groomed] : well groomed [No Deformities] : no deformities [General Appearance - Well Nourished] : well nourished [General Appearance - In No Acute Distress] : no acute distress [Normal Conjunctiva] : the conjunctiva exhibited no abnormalities [Eyelids - No Xanthelasma] : the eyelids demonstrated no xanthelasmas [Normal Oropharynx] : normal oropharynx [II] : II [Neck Appearance] : the appearance of the neck was normal [Neck Cervical Mass (___cm)] : no neck mass was observed [Jugular Venous Distention Increased] : there was no jugular-venous distention [Thyroid Diffuse Enlargement] : the thyroid was not enlarged [Thyroid Nodule] : there were no palpable thyroid nodules [Heart Rate And Rhythm] : heart rate and rhythm were normal [Heart Sounds] : normal S1 and S2 [Murmurs] : no murmurs present [Arterial Pulses Normal] : the arterial pulses were normal [Edema] : no peripheral edema present [Veins - Varicosity Changes] : no varicosital changes were noted in the lower extremities [Respiration, Rhythm And Depth] : normal respiratory rhythm and effort [Exaggerated Use Of Accessory Muscles For Inspiration] : no accessory muscle use [Auscultation Breath Sounds / Voice Sounds] : lungs were clear to auscultation bilaterally [Chest Palpation] : palpation of the chest revealed no abnormalities [Lungs Percussion] : the lungs were normal to percussion [Bowel Sounds] : normal bowel sounds [Abdomen Soft] : soft [Abdomen Tenderness] : non-tender [Abdomen Mass (___ Cm)] : no abdominal mass palpated [Abnormal Walk] : normal gait [Gait - Sufficient For Exercise Testing] : the gait was sufficient for exercise testing [Nail Clubbing] : no clubbing of the fingernails [Cyanosis, Localized] : no localized cyanosis [Petechial Hemorrhages (___cm)] : no petechial hemorrhages [] : no ischemic changes [Deep Tendon Reflexes (DTR)] : deep tendon reflexes were 2+ and symmetric [Sensation] : the sensory exam was normal to light touch and pinprick [No Focal Deficits] : no focal deficits [Oriented To Time, Place, And Person] : oriented to person, place, and time [Impaired Insight] : insight and judgment were intact [Affect] : the affect was normal [FreeTextEntry1] : rigfht hand erythma burn  first  degreee no evidence pustules cysts or  drainage

## 2021-06-14 NOTE — DISCUSSION/SUMMARY
[FreeTextEntry1] :  Vertigo -follow-up with cardiology consider Holter monitoring based on history of paroxysmal atrial fibrillation\par Neurologic consultation\par \par Cardiology noted Dr Cordero\par  ECHO\par  event monitor\par  Carotid duplex \par \par PET CT per Oncology July 2019\par Trial singulair 10 mg\par  Flonase  2 sprays [place] nostril QD\par \par Stable KAN-  with  component deconditioning\par Hodgkins x 5 yr  remission\par ECHO 5/7/21 \par LVHdiastolic dysfx\par Flu  shot up to  date/ pneumonia up  to  date\par Walking exercise advised and follow-up 3 months\par \par PFIZER COVID vaccine  completed  2 dose protocol\par \par Schedule well visit at follow-up\par  mammogram August 2021\par Timing bone density APril 2023 per ENDO\par

## 2021-08-01 ENCOUNTER — RX RENEWAL (OUTPATIENT)
Age: 81
End: 2021-08-01

## 2021-09-02 ENCOUNTER — NON-APPOINTMENT (OUTPATIENT)
Age: 81
End: 2021-09-02

## 2021-09-16 LAB — SARS-COV-2 N GENE NPH QL NAA+PROBE: NOT DETECTED

## 2021-09-20 ENCOUNTER — APPOINTMENT (OUTPATIENT)
Dept: PULMONOLOGY | Facility: CLINIC | Age: 81
End: 2021-09-20
Payer: MEDICARE

## 2021-09-20 VITALS
TEMPERATURE: 97.3 F | WEIGHT: 185 LBS | SYSTOLIC BLOOD PRESSURE: 148 MMHG | DIASTOLIC BLOOD PRESSURE: 88 MMHG | HEART RATE: 85 BPM | HEIGHT: 65 IN | OXYGEN SATURATION: 97 % | BODY MASS INDEX: 30.82 KG/M2 | RESPIRATION RATE: 16 BRPM

## 2021-09-20 LAB — POCT - HEMOGLOBIN (HGB), QUANTITATIVE, TRANSCUTANEOUS: 14.6

## 2021-09-20 PROCEDURE — G0008: CPT

## 2021-09-20 PROCEDURE — 94727 GAS DIL/WSHOT DETER LNG VOL: CPT

## 2021-09-20 PROCEDURE — 90662 IIV NO PRSV INCREASED AG IM: CPT

## 2021-09-20 PROCEDURE — 88738 HGB QUANT TRANSCUTANEOUS: CPT

## 2021-09-20 PROCEDURE — ZZZZZ: CPT

## 2021-09-20 PROCEDURE — 99214 OFFICE O/P EST MOD 30 MIN: CPT | Mod: 25

## 2021-09-20 PROCEDURE — 94729 DIFFUSING CAPACITY: CPT

## 2021-09-20 PROCEDURE — 94010 BREATHING CAPACITY TEST: CPT

## 2021-09-20 RX ORDER — CIPROFLOXACIN HYDROCHLORIDE 500 MG/1
500 TABLET, FILM COATED ORAL
Qty: 14 | Refills: 0 | Status: DISCONTINUED | COMMUNITY
Start: 2021-04-20

## 2021-09-20 NOTE — REVIEW OF SYSTEMS
[As Noted in HPI] : as noted in HPI [Trauma] : no ~T physical trauma [Fracture] : no fracture [Kyphoscoliosis] : no kyphoscoliosis [Back Pain] : ~T back pain [Myalgias] : myalgias [Arthralgias] : arthralgias [Negative] : Pulmonary Hypertension [FreeTextEntry9] : HARMONY BLACKWELLD

## 2021-09-20 NOTE — PROCEDURE
[FreeTextEntry1] : PFT 9/20/21\par Minimal OAD\par Lung Volumes nl \par TLC 92 %\par  low nl DLCO 73 % \par HGB 14.6\par \par Chest x-ray PA lateral June 14, 2021\par Cardiac size normal\par Lung fields are clear\par Lucina mediastinum unremarkable\par Soft tissue bony structures unremarkable\par No parenchymal infiltrates pleural effusions with dominant pulmonary nodules\par Impression clear lungs\par \par EKG 6/14/21\par NSR\par \par PFT 3/15/21\par normal flow rates\par Lung Volumes  normal\par  DLCO  69 % mild  reduction\par HGB 14.0\par \par X-ray PA lateral March 15, 2021\par Cardiac size normal\par No parenchymal infiltrate pulmonary opacities pulmonary consolidation dominant pulmonary nodules pleural effusion pneumothorax\par Soft tissue bony structures unremarkable\par Lucina mediastinum grossly unremarkable\par Impression clear lungs\par Comparison to February 7, 2020 without interval change\par \par PFT Nov 9 2020\par normal flow  rates minimal OAD \par normal lung volumes\par  Very mild / low nl DLCO  73 % pred\par HGB 13.5\par \par PET scan June 2020- for evidence of recurrent disease\par \par PFT August 3, 2020\par Flow rates normal with normal spirometry\par Total lung capacity normal 80% of predicted.\par Diffusion 79% predicted\par Hemoglobin 13.8.\par \par Pulmonary 6-minute walk step test August 3, 2020\par Total steps 120\par Room air Baseline room air O2 saturation 98%\par With increased maximum heart rate to 1 10–1 12 Aidan desaturation 94 to 95%\par Impression normal study\par Negative exertional hypoxemia\par \par X-ray PA lateral February 7, 2020\par Normal cardiac size\par No parenchymal infiltrate pleural effusions dominant pulmonary nodules\par Impression clear lungs\par \par Spirometry  11/19/2019\par  Normal  flow  rates\par  No BD  at  FEV!\par \par PFT 6/19/2019\par Normal Flow s Rates\par  12 Percent response to bronchodilator at FEV1\par Lung volumes normal\par  DLCO 80 %\par HGB14.3\par \par PET CT  No Evidence active disease July 2019\par  \par ECHO 1/2019\par  diastolic dysfx\par No reported PAP\par \par Urine  noted\par Spirometry 8/22/2019\par  without bronchodilator normal flow rates stable\par \par DTaP administration\par Completed to dose shingles Shingrix protocol\par \par Blood Draw\par Data review August 23, 2019\par CBC normal range\par TFTs normal\par Hemoglobin A1c 5.6% with mean plasma glucose 114\par Lipid profile\par Total cholesterol 135 HDL 35 LDL 69\par Triglycerides nonfasting 156\par Vitamin D 36.8\par Serum sodium 146\par Serum potassium 3.6\par BUN 16 creatinine 0.73\par Serum calcium 9.7\par Liver function testing normal\par \par HD FLU IM 9/20/21

## 2021-09-20 NOTE — DISCUSSION/SUMMARY
[FreeTextEntry1] : PNDS - GERD\par added flonase inc dose prilosec with nasal atelin\par \par  Vertigo -follow-up with cardiology consider Holter monitoring based on history of paroxysmal atrial fibrillation\par Neurologic consultation\par \par Cardiology noted Dr Cordero\par  ECHO\par  event monitor\par  Carotid duplex \par \par PET CT per Oncology July 2019\par Trial singulair 10 mg\par  Flonase  2 sprays [place] nostril QD\par \par Stable KAN-  with  component deconditioning\par Hodgkins x 5 yr  remission\par ECHO 5/7/21 \par LVH diastolic dysfx\par Flu  shot up to  date/ pneumonia up  to  date\par Walking exercise advised and follow-up 3 months\par \par PFIZER COVID vaccine  completed  2 dose protocol\par \par Schedule well visit at follow-up\par  mammogram August 2021\par Timing bone density APril 2023 per ENDO\par

## 2021-10-14 ENCOUNTER — APPOINTMENT (OUTPATIENT)
Dept: CARDIOLOGY | Facility: CLINIC | Age: 81
End: 2021-10-14
Payer: MEDICARE

## 2021-10-14 ENCOUNTER — NON-APPOINTMENT (OUTPATIENT)
Age: 81
End: 2021-10-14

## 2021-10-14 VITALS — SYSTOLIC BLOOD PRESSURE: 136 MMHG | DIASTOLIC BLOOD PRESSURE: 80 MMHG

## 2021-10-14 VITALS
HEIGHT: 65 IN | DIASTOLIC BLOOD PRESSURE: 80 MMHG | BODY MASS INDEX: 31.32 KG/M2 | SYSTOLIC BLOOD PRESSURE: 165 MMHG | HEART RATE: 80 BPM | OXYGEN SATURATION: 96 % | WEIGHT: 188 LBS

## 2021-10-14 PROCEDURE — 99214 OFFICE O/P EST MOD 30 MIN: CPT

## 2021-10-14 PROCEDURE — 93000 ELECTROCARDIOGRAM COMPLETE: CPT

## 2021-10-14 RX ORDER — HYDROCHLOROTHIAZIDE 12.5 MG/1
12.5 TABLET ORAL
Qty: 90 | Refills: 1 | Status: DISCONTINUED | COMMUNITY
Start: 2019-10-28 | End: 2021-10-14

## 2021-10-14 RX ORDER — ZOSTER VACCINE RECOMBINANT, ADJUVANTED 50 MCG/0.5
50 KIT INTRAMUSCULAR
Qty: 1 | Refills: 0 | Status: DISCONTINUED | COMMUNITY
Start: 2019-07-22 | End: 2021-10-14

## 2021-10-14 RX ORDER — FUROSEMIDE 20 MG/1
20 TABLET ORAL
Qty: 30 | Refills: 0 | Status: DISCONTINUED | COMMUNITY
Start: 2021-03-15 | End: 2021-10-14

## 2021-10-14 RX ORDER — SULFAMETHOXAZOLE AND TRIMETHOPRIM 800; 160 MG/1; MG/1
800-160 TABLET ORAL TWICE DAILY
Qty: 14 | Refills: 0 | Status: DISCONTINUED | COMMUNITY
Start: 2020-11-30 | End: 2021-10-14

## 2021-10-17 ENCOUNTER — RX RENEWAL (OUTPATIENT)
Age: 81
End: 2021-10-17

## 2021-10-26 ENCOUNTER — RX RENEWAL (OUTPATIENT)
Age: 81
End: 2021-10-26

## 2021-10-27 ENCOUNTER — RX RENEWAL (OUTPATIENT)
Age: 81
End: 2021-10-27

## 2021-12-20 ENCOUNTER — APPOINTMENT (OUTPATIENT)
Dept: PULMONOLOGY | Facility: CLINIC | Age: 81
End: 2021-12-20
Payer: MEDICARE

## 2021-12-20 VITALS — DIASTOLIC BLOOD PRESSURE: 79 MMHG | SYSTOLIC BLOOD PRESSURE: 135 MMHG

## 2021-12-20 VITALS
SYSTOLIC BLOOD PRESSURE: 159 MMHG | TEMPERATURE: 97.2 F | OXYGEN SATURATION: 96 % | DIASTOLIC BLOOD PRESSURE: 85 MMHG | HEART RATE: 92 BPM

## 2021-12-20 DIAGNOSIS — R06.02 SHORTNESS OF BREATH: ICD-10-CM

## 2021-12-20 PROCEDURE — 99213 OFFICE O/P EST LOW 20 MIN: CPT

## 2021-12-20 NOTE — DISCUSSION/SUMMARY
[FreeTextEntry1] : PNDS - GERD\par added flonase inc dose prilosec with nasal atelin\par \par  Vertigo -follow-up with cardiology consider Holter monitoring based on history of paroxysmal atrial fibrillation\par Neurologic consultation\par \par Cardiology noted Dr Cordero\par  ECHO\par  event monitor\par  Carotid duplex \par \par PET CT per Oncology July 2019\par Trial singulair 10 mg\par  Flonase  2 sprays [place] nostril QD\par \par Stable KAN-  with  component deconditioning\par Hodgkins x 5 yr  remission\par ECHO 5/7/21 \par LVH diastolic dysfx\par Flu  shot up to  date/ pneumonia up  to  date\par Walking exercise advised and follow-up 3 months\par \par PFIZER COVID vaccine  completed  2 dose protocol + booster\par \par Schedule well visit at follow-up\par  mammogram August 2021\par Timing bone density APril 2023 per ENDO\par

## 2022-01-20 ENCOUNTER — RX RENEWAL (OUTPATIENT)
Age: 82
End: 2022-01-20

## 2022-03-24 DIAGNOSIS — Z01.812 ENCOUNTER FOR PREPROCEDURAL LABORATORY EXAMINATION: ICD-10-CM

## 2022-03-29 LAB — SARS-COV-2 N GENE NPH QL NAA+PROBE: NOT DETECTED

## 2022-03-31 ENCOUNTER — APPOINTMENT (OUTPATIENT)
Dept: PULMONOLOGY | Facility: CLINIC | Age: 82
End: 2022-03-31
Payer: MEDICARE

## 2022-03-31 VITALS
TEMPERATURE: 97.5 F | OXYGEN SATURATION: 91 % | SYSTOLIC BLOOD PRESSURE: 121 MMHG | DIASTOLIC BLOOD PRESSURE: 76 MMHG | HEART RATE: 82 BPM

## 2022-03-31 LAB — POCT - HEMOGLOBIN (HGB), QUANTITATIVE, TRANSCUTANEOUS: 12.6

## 2022-03-31 PROCEDURE — ZZZZZ: CPT

## 2022-03-31 PROCEDURE — 94010 BREATHING CAPACITY TEST: CPT

## 2022-03-31 PROCEDURE — 88738 HGB QUANT TRANSCUTANEOUS: CPT

## 2022-03-31 PROCEDURE — 71046 X-RAY EXAM CHEST 2 VIEWS: CPT

## 2022-03-31 PROCEDURE — 94729 DIFFUSING CAPACITY: CPT

## 2022-03-31 PROCEDURE — 94727 GAS DIL/WSHOT DETER LNG VOL: CPT

## 2022-03-31 PROCEDURE — 99214 OFFICE O/P EST MOD 30 MIN: CPT | Mod: 25

## 2022-03-31 NOTE — PROCEDURE
[FreeTextEntry1] : PFT 3/31/22\par  Mild reduction FVC\par TLC 79 % \par DLCO 69 % minimal reduction\par HGB 12.6\par \par Pulmonary 6 minute exercise study\par 3/31 /22\par RA study  Negative \par \par Chest x-ray PA lateral March 31, 2022\par Normal cardiac size\par Clear lung fields\par No parenchymal infiltrates pleural effusions dominant pulmonary nodules\par Soft tissue bony structures unremarkable\par Mild kyphosis with thinning vertebral spine\par No gross interval change compared to chest x-ray of June 14, 2021\par \par PFT 9/20/21\par Minimal OAD\par Lung Volumes nl \par TLC 92 %\par  low nl DLCO 73 % \par HGB 14.6\par \par Chest x-ray PA lateral June 14, 2021\par Cardiac size normal\par Lung fields are clear\par Lucina mediastinum unremarkable\par Soft tissue bony structures unremarkable\par No parenchymal infiltrates pleural effusions with dominant pulmonary nodules\par Impression clear lungs\par \par EKG 6/14/21\par NSR\par \par PFT 3/15/21\par normal flow rates\par Lung Volumes  normal\par  DLCO  69 % mild  reduction\par HGB 14.0\par \par X-ray PA lateral March 15, 2021\par Cardiac size normal\par No parenchymal infiltrate pulmonary opacities pulmonary consolidation dominant pulmonary nodules pleural effusion pneumothorax\par Soft tissue bony structures unremarkable\par Lucina mediastinum grossly unremarkable\par Impression clear lungs\par Comparison to February 7, 2020 without interval change\par \par PFT Nov 9 2020\par normal flow  rates minimal OAD \par normal lung volumes\par  Very mild / low nl DLCO  73 % pred\par HGB 13.5\par \par PET scan June 2020- for evidence of recurrent disease\par \par PFT August 3, 2020\par Flow rates normal with normal spirometry\par Total lung capacity normal 80% of predicted.\par Diffusion 79% predicted\par Hemoglobin 13.8.\par \par Pulmonary 6-minute walk step test August 3, 2020\par Total steps 120\par Room air Baseline room air O2 saturation 98%\par With increased maximum heart rate to 1 10–1 12 Aidan desaturation 94 to 95%\par Impression normal study\par Negative exertional hypoxemia\par \par X-ray PA lateral February 7, 2020\par Normal cardiac size\par No parenchymal infiltrate pleural effusions dominant pulmonary nodules\par Impression clear lungs\par \par Spirometry  11/19/2019\par  Normal  flow  rates\par  No BD  at  FEV!\par \par PFT 6/19/2019\par Normal Flow s Rates\par  12 Percent response to bronchodilator at FEV1\par Lung volumes normal\par  DLCO 80 %\par HGB14.3\par \par PET CT  No Evidence active disease July 2019\par  \par ECHO 1/2019\par  diastolic dysfx\par No reported PAP\par \par Urine  noted\par Spirometry 8/22/2019\par  without bronchodilator normal flow rates stable\par \par DTaP administration\par Completed to dose shingles Shingrix protocol\par \par Blood Draw\par Data review August 23, 2019\par CBC normal range\par TFTs normal\par Hemoglobin A1c 5.6% with mean plasma glucose 114\par Lipid profile\par Total cholesterol 135 HDL 35 LDL 69\par Triglycerides nonfasting 156\par Vitamin D 36.8\par Serum sodium 146\par Serum potassium 3.6\par BUN 16 creatinine 0.73\par Serum calcium 9.7\par Liver function testing normal\par \par HD FLU IM 9/20/21

## 2022-04-14 ENCOUNTER — APPOINTMENT (OUTPATIENT)
Dept: CARDIOLOGY | Facility: CLINIC | Age: 82
End: 2022-04-14
Payer: MEDICARE

## 2022-04-14 ENCOUNTER — NON-APPOINTMENT (OUTPATIENT)
Age: 82
End: 2022-04-14

## 2022-04-14 VITALS
OXYGEN SATURATION: 98 % | WEIGHT: 190 LBS | BODY MASS INDEX: 31.65 KG/M2 | SYSTOLIC BLOOD PRESSURE: 144 MMHG | HEART RATE: 74 BPM | DIASTOLIC BLOOD PRESSURE: 80 MMHG | HEIGHT: 65 IN

## 2022-04-14 PROCEDURE — 99214 OFFICE O/P EST MOD 30 MIN: CPT

## 2022-04-14 PROCEDURE — 93000 ELECTROCARDIOGRAM COMPLETE: CPT

## 2022-05-23 ENCOUNTER — NON-APPOINTMENT (OUTPATIENT)
Age: 82
End: 2022-05-23

## 2022-06-19 ENCOUNTER — RX RENEWAL (OUTPATIENT)
Age: 82
End: 2022-06-19

## 2022-07-06 ENCOUNTER — APPOINTMENT (OUTPATIENT)
Dept: PULMONOLOGY | Facility: CLINIC | Age: 82
End: 2022-07-06

## 2022-07-06 VITALS
DIASTOLIC BLOOD PRESSURE: 81 MMHG | SYSTOLIC BLOOD PRESSURE: 136 MMHG | HEART RATE: 84 BPM | OXYGEN SATURATION: 96 % | TEMPERATURE: 97.6 F

## 2022-07-06 PROCEDURE — 95012 NITRIC OXIDE EXP GAS DETER: CPT

## 2022-07-06 PROCEDURE — 99214 OFFICE O/P EST MOD 30 MIN: CPT | Mod: 25

## 2022-07-06 PROCEDURE — 94010 BREATHING CAPACITY TEST: CPT

## 2022-07-06 NOTE — PHYSICAL EXAM
[General Appearance - Well Developed] : well developed [Normal Appearance] : normal appearance [Well Groomed] : well groomed [General Appearance - Well Nourished] : well nourished [No Deformities] : no deformities [General Appearance - In No Acute Distress] : no acute distress [Normal Conjunctiva] : the conjunctiva exhibited no abnormalities [Eyelids - No Xanthelasma] : the eyelids demonstrated no xanthelasmas [Normal Oropharynx] : normal oropharynx [II] : II [Neck Appearance] : the appearance of the neck was normal [Neck Cervical Mass (___cm)] : no neck mass was observed [Jugular Venous Distention Increased] : there was no jugular-venous distention [Thyroid Diffuse Enlargement] : the thyroid was not enlarged [Thyroid Nodule] : there were no palpable thyroid nodules [Heart Sounds] : normal S1 and S2 [Heart Rate And Rhythm] : heart rate and rhythm were normal [Murmurs] : no murmurs present [Arterial Pulses Normal] : the arterial pulses were normal [Edema] : no peripheral edema present [Veins - Varicosity Changes] : no varicosital changes were noted in the lower extremities [Respiration, Rhythm And Depth] : normal respiratory rhythm and effort [Exaggerated Use Of Accessory Muscles For Inspiration] : no accessory muscle use [Auscultation Breath Sounds / Voice Sounds] : lungs were clear to auscultation bilaterally [Chest Palpation] : palpation of the chest revealed no abnormalities [Lungs Percussion] : the lungs were normal to percussion [Bowel Sounds] : normal bowel sounds [Abdomen Soft] : soft [Abdomen Tenderness] : non-tender [Abdomen Mass (___ Cm)] : no abdominal mass palpated [Abnormal Walk] : normal gait [Gait - Sufficient For Exercise Testing] : the gait was sufficient for exercise testing [Nail Clubbing] : no clubbing of the fingernails [Cyanosis, Localized] : no localized cyanosis [Petechial Hemorrhages (___cm)] : no petechial hemorrhages [] : no ischemic changes [Deep Tendon Reflexes (DTR)] : deep tendon reflexes were 2+ and symmetric [Sensation] : the sensory exam was normal to light touch and pinprick [No Focal Deficits] : no focal deficits [Oriented To Time, Place, And Person] : oriented to person, place, and time [Impaired Insight] : insight and judgment were intact [Affect] : the affect was normal [FreeTextEntry1] : rigfht hand erythma burn  first  degreee no evidence pustules cysts or  drainage

## 2022-07-06 NOTE — HISTORY OF PRESENT ILLNESS
[Stable] : are stable [Difficulty Breathing During Exertion] : stable dyspnea on exertion [Feelings Of Weakness On Exertion] : stable exercise intolerance [Cough] : denies coughing [Wheezing] : denies wheezing [Regional Soft Tissue Swelling Both Lower Extremities] : denies lower extremity edema [Chest Pain Or Discomfort] : denies chest pain [Fever] : denies fever [Obstructive Sleep Apnea] : obstructive sleep apnea [Date: ___] : Date of most recent diagnostic polysomnogram: [unfilled] [AHI: ___ per hour] : Apnea-hypopnea index:  [unfilled] per hour [Wt Gain ___ Lbs] : no recent weight gain [Oxygen] : the patient uses no supplemental oxygen [FreeTextEntry1] : no active resp complaints \par awakens little had stiffness\par \par Cardiology reviewed and noted\par Patient states up-to-date with hematology oncology for which was MD\par \par KAN stable no decline\par Oncology\par appt set  non active remission Hodgkins

## 2022-07-06 NOTE — PROCEDURE
[FreeTextEntry1] : Ck 7/6/22\par FVC 73 %\par stable pul flows\par \par PFT 3/31/22\par  Mild reduction FVC\par TLC 79 % \par DLCO 69 % minimal reduction\par HGB 12.6\par \par Pulmonary 6 minute exercise study\par 3/31 /22\par RA study  Negative \par \par Chest x-ray PA lateral March 31, 2022\par Normal cardiac size\par Clear lung fields\par No parenchymal infiltrates pleural effusions dominant pulmonary nodules\par Soft tissue bony structures unremarkable\par Mild kyphosis with thinning vertebral spine\par No gross interval change compared to chest x-ray of June 14, 2021\par \par PFT 9/20/21\par Minimal OAD\par Lung Volumes nl \par TLC 92 %\par  low nl DLCO 73 % \par HGB 14.6\par \par Chest x-ray PA lateral June 14, 2021\par Cardiac size normal\par Lung fields are clear\par Lucina mediastinum unremarkable\par Soft tissue bony structures unremarkable\par No parenchymal infiltrates pleural effusions with dominant pulmonary nodules\par Impression clear lungs\par \par EKG 6/14/21\par NSR\par \par PFT 3/15/21\par normal flow rates\par Lung Volumes  normal\par  DLCO  69 % mild  reduction\par HGB 14.0\par \par X-ray PA lateral March 15, 2021\par Cardiac size normal\par No parenchymal infiltrate pulmonary opacities pulmonary consolidation dominant pulmonary nodules pleural effusion pneumothorax\par Soft tissue bony structures unremarkable\par Lucina mediastinum grossly unremarkable\par Impression clear lungs\par Comparison to February 7, 2020 without interval change\par \par PFT Nov 9 2020\par normal flow  rates minimal OAD \par normal lung volumes\par  Very mild / low nl DLCO  73 % pred\par HGB 13.5\par \par PET scan June 2020- for evidence of recurrent disease\par \par PFT August 3, 2020\par Flow rates normal with normal spirometry\par Total lung capacity normal 80% of predicted.\par Diffusion 79% predicted\par Hemoglobin 13.8.\par \par Pulmonary 6-minute walk step test August 3, 2020\par Total steps 120\par Room air Baseline room air O2 saturation 98%\par With increased maximum heart rate to 1 10–1 12 Aid[place] desaturation 94 to 95%\par Impression normal study\par Negative exertional hypoxemia\par \par X-ray PA lateral February 7, 2020\par Normal cardiac size\par No parenchymal infiltrate pleural effusions dominant pulmonary nodules\par Impression clear lungs\par \par Spirometry  11/19/2019\par  Normal  flow  rates\par  No BD  at  FEV!\par \par PFT 6/19/2019\par Normal Flow s Rates\par  12 Percent response to bronchodilator at FEV1\par Lung volumes normal\par  DLCO 80 %\par HGB14.3\par \par PET CT  No Evidence active disease July 2019\par  \par ECHO 1/2019\par  diastolic dysfx\par No reported PAP\par \par Urine  noted\par Spirometry 8/22/2019\par  without bronchodilator normal flow rates stable\par \par DTaP administration\par Completed to dose shingles Shingrix protocol\par \par Blood Draw\par Data review August 23, 2019\par CBC normal range\par TFTs normal\par Hemoglobin A1c 5.6% with mean plasma glucose 114\par Lipid profile\par Total cholesterol 135 HDL 35 LDL 69\par Triglycerides nonfasting 156\par Vitamin D 36.8\par Serum sodium 146\par Serum potassium 3.6\par BUN 16 creatinine 0.73\par Serum calcium 9.7\par Liver function testing normal\par \par HD FLU IM 9/20/21

## 2022-07-06 NOTE — DISCUSSION/SUMMARY
[FreeTextEntry1] : PNDS - GERD\par added flonase inc dose prilosec with nasal atelin\par \par  Vertigo -follow-up with cardiology consider Holter monitoring based on history of paroxysmal atrial fibrillation\par Neurologic consultation\par \par Cardiology noted Dr Cordero\par  ECHO\par  event monitor\par  Carotid duplex \par \par PET CT per Oncology July 2019 with APPT [facility] July 7 2022\par Trial singulair 10 mg\par  Flonase  2 sprays [place] nostril QD\par \par Stable KAN-  with  component deconditioning\par Hodgkins x 5 yr  remission\par ECHO 5/7/21 \par LVH diastolic dysfx\par Flu  shot up to  date/ pneumonia up  to  date\par Walking exercise advised and follow-up 3 months\par \par PFIZER COVID vaccine  completed  2 dose protocol + booster\par \par Schedule well visit at follow-up\par  mammogram August 2021states up to date and scheduled  Aug 2022\par Timing bone density APril 2023 per ENDO\par

## 2022-07-28 ENCOUNTER — APPOINTMENT (OUTPATIENT)
Dept: CARDIOLOGY | Facility: CLINIC | Age: 82
End: 2022-07-28

## 2022-07-28 PROCEDURE — 93224 XTRNL ECG REC UP TO 48 HRS: CPT

## 2022-09-07 ENCOUNTER — APPOINTMENT (OUTPATIENT)
Dept: CARDIOLOGY | Facility: CLINIC | Age: 82
End: 2022-09-07

## 2022-09-12 ENCOUNTER — RX RENEWAL (OUTPATIENT)
Age: 82
End: 2022-09-12

## 2022-10-02 ENCOUNTER — NON-APPOINTMENT (OUTPATIENT)
Age: 82
End: 2022-10-02

## 2022-10-06 ENCOUNTER — APPOINTMENT (OUTPATIENT)
Dept: PULMONOLOGY | Facility: CLINIC | Age: 82
End: 2022-10-06

## 2022-10-30 ENCOUNTER — RX RENEWAL (OUTPATIENT)
Age: 82
End: 2022-10-30

## 2022-11-16 ENCOUNTER — APPOINTMENT (OUTPATIENT)
Dept: PULMONOLOGY | Facility: CLINIC | Age: 82
End: 2022-11-16

## 2022-11-16 VITALS — DIASTOLIC BLOOD PRESSURE: 78 MMHG | SYSTOLIC BLOOD PRESSURE: 123 MMHG | OXYGEN SATURATION: 94 % | HEART RATE: 83 BPM

## 2022-11-16 LAB — POCT - HEMOGLOBIN (HGB), QUANTITATIVE, TRANSCUTANEOUS: 14.5

## 2022-11-16 PROCEDURE — ZZZZZ: CPT

## 2022-11-16 PROCEDURE — 88738 HGB QUANT TRANSCUTANEOUS: CPT

## 2022-11-16 PROCEDURE — 94010 BREATHING CAPACITY TEST: CPT

## 2022-11-16 PROCEDURE — 94727 GAS DIL/WSHOT DETER LNG VOL: CPT

## 2022-11-16 PROCEDURE — 94729 DIFFUSING CAPACITY: CPT

## 2022-11-16 PROCEDURE — 99214 OFFICE O/P EST MOD 30 MIN: CPT | Mod: 25

## 2022-11-16 RX ORDER — POTASSIUM CHLORIDE 750 MG/1
10 TABLET, EXTENDED RELEASE ORAL
Qty: 30 | Refills: 1 | Status: ACTIVE | COMMUNITY
Start: 2019-01-29 | End: 1900-01-01

## 2022-11-16 RX ORDER — ALBUTEROL SULFATE 90 UG/1
108 (90 BASE) INHALANT RESPIRATORY (INHALATION)
Qty: 1 | Refills: 3 | Status: ACTIVE | COMMUNITY
Start: 2019-02-25 | End: 1900-01-01

## 2022-11-16 NOTE — PROCEDURE
[FreeTextEntry1] : PFT 11/16/2022\par Flow rates nl\par  Lung volumes nl\par  DLCO  64 % with mild  loss fx  alveolar  capillary units HGB 14.5\par \par Ck 7/6/22\par FVC 73 %\par stable pul flows\par \par PFT 3/31/22\par  Mild reduction FVC\par TLC 79 % \par DLCO 69 % minimal reduction\par HGB 12.6\par \par Pulmonary 6 minute exercise study\par 3/31 /22\par RA study  Negative \par \par Chest x-ray PA lateral March 31, 2022\par Normal cardiac size\par Clear lung fields\par No parenchymal infiltrates pleural effusions dominant pulmonary nodules\par Soft tissue bony structures unremarkable\par Mild kyphosis with thinning vertebral spine\par No gross interval change compared to chest x-ray of June 14, 2021\par \par PFT 9/20/21\par Minimal OAD\par Lung Volumes nl \par TLC 92 %\par  low nl DLCO 73 % \par HGB 14.6\par \par Chest x-ray PA lateral June 14, 2021\par Cardiac size normal\par Lung fields are clear\par Lucina mediastinum unremarkable\par Soft tissue bony structures unremarkable\par No parenchymal infiltrates pleural effusions with dominant pulmonary nodules\par Impression clear lungs\par \par EKG 6/14/21\par NSR\par \par PFT 3/15/21\par normal flow rates\par Lung Volumes  normal\par  DLCO  69 % mild  reduction\par HGB 14.0\par \par X-ray PA lateral March 15, 2021\par Cardiac size normal\par No parenchymal infiltrate pulmonary opacities pulmonary consolidation dominant pulmonary nodules pleural effusion pneumothorax\par Soft tissue bony structures unremarkable\par Lucina mediastinum grossly unremarkable\par Impression clear lungs\par Comparison to February 7, 2020 without interval change\par \par PFT Nov 9 2020\par normal flow  rates minimal OAD \par normal lung volumes\par  Very mild / low nl DLCO  73 % pred\par HGB 13.5\par \par PET scan June 2020- for evidence of recurrent disease\par \par PFT August 3, 2020\par Flow rates normal with normal spirometry\par Total lung capacity normal 80% of predicted.\par Diffusion 79% predicted\par Hemoglobin 13.8.\par \par Pulmonary 6-minute walk step test August 3, 2020\par Total steps 120\par Room air Baseline room air O2 saturation 98%\par With increased maximum heart rate to 1 10–1 12 Aidan desaturation 94 to 95%\par Impression normal study\par Negative exertional hypoxemia\par \par X-ray PA lateral February 7, 2020\par Normal cardiac size\par No parenchymal infiltrate pleural effusions dominant pulmonary nodules\par Impression clear lungs\par \par Spirometry  11/19/2019\par  Normal  flow  rates\par  No BD  at  FEV!\par \par PFT 6/19/2019\par Normal Flow s Rates\par  12 Percent response to bronchodilator at FEV1\par Lung volumes normal\par  DLCO 80 %\par HGB14.3\par \par PET CT  No Evidence active disease July 2019\par  \par ECHO 1/2019\par  diastolic dysfx\par No reported PAP\par \par Urine  noted\par Spirometry 8/22/2019\par  without bronchodilator normal flow rates stable\par \par DTaP administration\par Completed to dose shingles Shingrix protocol\par \par Blood Draw\par Data review August 23, 2019\par CBC normal range\par TFTs normal\par Hemoglobin A1c 5.6% with mean plasma glucose 114\par Lipid profile\par Total cholesterol 135 HDL 35 LDL 69\par Triglycerides nonfasting 156\par Vitamin D 36.8\par Serum sodium 146\par Serum potassium 3.6\par BUN 16 creatinine 0.73\par Serum calcium 9.7\par Liver function testing normal\par \par HD FLU IM 9/20/21

## 2022-12-20 ENCOUNTER — APPOINTMENT (OUTPATIENT)
Dept: PULMONOLOGY | Facility: CLINIC | Age: 82
End: 2022-12-20

## 2022-12-20 DIAGNOSIS — J20.9 ACUTE BRONCHITIS, UNSPECIFIED: ICD-10-CM

## 2022-12-20 PROCEDURE — 99213 OFFICE O/P EST LOW 20 MIN: CPT | Mod: 95

## 2022-12-20 NOTE — REASON FOR VISIT
[Home] : at home, [unfilled] , at the time of the visit. [Medical Office: ([facility])___] : at the medical office located in  [Patient] : the patient

## 2022-12-20 NOTE — REVIEW OF SYSTEMS
[Fatigue] : fatigue [Nasal Congestion] : nasal congestion [Postnasal Drip] : postnasal drip [Cough] : cough

## 2022-12-29 ENCOUNTER — NON-APPOINTMENT (OUTPATIENT)
Age: 82
End: 2022-12-29

## 2022-12-29 ENCOUNTER — APPOINTMENT (OUTPATIENT)
Dept: PULMONOLOGY | Facility: CLINIC | Age: 82
End: 2022-12-29

## 2022-12-29 ENCOUNTER — EMERGENCY (EMERGENCY)
Facility: HOSPITAL | Age: 82
LOS: 1 days | Discharge: HOME | End: 2022-12-29
Attending: STUDENT IN AN ORGANIZED HEALTH CARE EDUCATION/TRAINING PROGRAM
Payer: MEDICARE

## 2022-12-29 VITALS
OXYGEN SATURATION: 98 % | SYSTOLIC BLOOD PRESSURE: 163 MMHG | TEMPERATURE: 98 F | RESPIRATION RATE: 18 BRPM | HEART RATE: 70 BPM | DIASTOLIC BLOOD PRESSURE: 80 MMHG

## 2022-12-29 VITALS
HEART RATE: 63 BPM | HEIGHT: 65 IN | SYSTOLIC BLOOD PRESSURE: 149 MMHG | WEIGHT: 190.04 LBS | RESPIRATION RATE: 17 BRPM | OXYGEN SATURATION: 97 % | TEMPERATURE: 99 F | DIASTOLIC BLOOD PRESSURE: 80 MMHG

## 2022-12-29 DIAGNOSIS — Z98.89 OTHER SPECIFIED POSTPROCEDURAL STATES: Chronic | ICD-10-CM

## 2022-12-29 PROCEDURE — 99284 EMERGENCY DEPT VISIT MOD MDM: CPT | Mod: 25

## 2022-12-29 PROCEDURE — 73502 X-RAY EXAM HIP UNI 2-3 VIEWS: CPT

## 2022-12-29 PROCEDURE — 71045 X-RAY EXAM CHEST 1 VIEW: CPT | Mod: 26

## 2022-12-29 PROCEDURE — 99283 EMERGENCY DEPT VISIT LOW MDM: CPT | Mod: FS

## 2022-12-29 PROCEDURE — 72170 X-RAY EXAM OF PELVIS: CPT

## 2022-12-29 PROCEDURE — 71045 X-RAY EXAM CHEST 1 VIEW: CPT

## 2022-12-29 PROCEDURE — 73502 X-RAY EXAM HIP UNI 2-3 VIEWS: CPT | Mod: 26,LT

## 2022-12-29 RX ORDER — ACETAMINOPHEN 500 MG
975 TABLET ORAL ONCE
Refills: 0 | Status: COMPLETED | OUTPATIENT
Start: 2022-12-29 | End: 2022-12-29

## 2022-12-29 NOTE — ED PROVIDER NOTE - PATIENT PORTAL LINK FT
You can access the FollowMyHealth Patient Portal offered by [facility] by registering at the following website: [email or web address]. By joining [facility]’s FollowMyHealth portal, you will also be able to view your health information using other applications (apps) compatible with our system.

## 2022-12-29 NOTE — ED PROVIDER NOTE - ATTENDING APP SHARED VISIT CONTRIBUTION OF CARE
I, Mandi Serna, performed a history and physical exam of the patient and discussed their management with the resident and /or advanced care provider. I reviewed the resident and /or ACP's note and agree with the documented findings and plan of care. I was present and available for all procedures.     82-year-old female history of lymphoma, hypertension, GERD presenting to the ED with complaints of left lower extremity pain.  Patient states that she thinks she has sciatic type pain in the left lower back radiating down to the leg for the past week.  Patient reports that she has been having sharp pain in the lateral left leg radiating down wrapping around to the medial thigh and anterior calf.  Worse with change in position having difficulty sleeping at night due to pain.  States that she has had a cough for the past few days, she had congestion in her chest that she was going to her doctor's office to have a checkup.  States that while she was walking her left leg just gave out causing her to fall onto her right side.  Denies head trauma or LOC.  No chest pain or shortness of breath or lightheadedness.  Was able to get back up and make it to her apartment.  Where her son then drove her to the ED.  Patient stating that she is not having any back pain or thigh pain at this time is having constant pain in the left lower shin.  Has any numbness or tingling.  No bowel or bladder incontinence denies urinary retention.  No fevers or chills.  No saddle anesthesia.  Patient states that she feels that her left lower extremity is weaker however able to bend knee left leg off stretcher and kick with adequate strength.  But notes that she feels that she is having trouble flexing at the hip.    Patient is well-appearing in no acute distress.  Resting comfortably in stretcher.  Regular rate and rhythm, lungs clear to auscultation bilaterally, speaking full sentences with no increased work of breathing.  Saturating well on room air.  Abdomen is soft nontender no rebound or guarding.  No tenderness of the hip thigh calf.  Distal pulses intact.  The left slightly off the stretcher both the left leg when bending the knee 5 out of 5 strength with kicking.  Sensation intact.  Negative straight leg raise.  No midline tenderness or step-offs.  No rashes noted.  Possible sciatic pain, herniated disc.  Low suspicion for cauda equina at this time.  Will obtain x-ray of the hip/pelvis as well as a chest x-ray.  Analgesia and reassess.

## 2022-12-29 NOTE — ED PROVIDER NOTE - NSFOLLOWUPINSTRUCTIONS_ED_ALL_ED_FT
Thank you for visiting our Emergency Department, it has been a pleasure taking part in your healthcare. Please follow up with your primary doctor within x48 hours.    Your discharge diagnosis is:    Return precautions to the Emergency Department include but are not limited to: unrelenting nausea, vomiting, fever, chills, chest pain, shortness of breath, dizziness, abdominal pain, worsening pain, syncope, blood in urine or stool, headache that doesn't resolve, numbness or tingling, loss of sensation, loss of motor function, or any other concerning symptoms. Thank you for visiting our Emergency Department, it has been a pleasure taking part in your healthcare. Please follow up with your primary doctor within x48 hours.    Your discharge diagnosis is: Sciatic leg pain    Return precautions to the Emergency Department include but are not limited to: unrelenting nausea, vomiting, fever, chills, chest pain, shortness of breath, dizziness, abdominal pain, worsening pain, syncope, blood in urine or stool, headache that doesn't resolve, numbness or tingling, loss of sensation, loss of motor function, or any other concerning symptoms.    -- Please use 650mg Tylenol (also called acetaminophen) every 4 hours & 600mg Motrin (also called Advil or ibuprofen) every 6 hours as needed for pain/discomfort/swelling. You can get these without a prescription. Don't use more than 3500mg of Tylenol in any 24-hour period. Make sure your other prescription/over-the-counter medications don't contain any Tylenol so you don't take too much. If you have any stomach discomfort while taking Motrin, you can use TUMS or Pepcid or Zantac (these can all be bought without a prescription).     follow up with the [facility] if pain persists.   [phone]

## 2022-12-29 NOTE — ED ADULT NURSE NOTE - OBJECTIVE STATEMENT
Pt complaining of fall.  Pt reports she was walking down the stairs of her building lobby when her left leg "gave out" and she fell down one step onto her right side, broke most of the fall by gripping the banister.  LLE has been painful x 1 week, atraumatic, began in left posterior hip and radiates down to shin.  No complaints from fall today, did not hit head or lose consciousness.  No other recent falls. Pt complaining of fall.  Pt reports she was walking down the stairs of her building lobby when her left leg "gave out" and she fell down one step onto her right side, broke most of the fall by gripping the banister.  LLE has been painful x 1 week, atraumatic, began in left posterior hip and radiates down to shin, worsening yesterday into today.  No complaints from fall today, did not hit head or lose consciousness, was able to ambulate independently afterwards.  No wounds, deformities or abrasions.  No other recent falls.

## 2022-12-29 NOTE — ED ADULT TRIAGE NOTE - CHIEF COMPLAINT QUOTE
LLE discomfort x1 week, states "leg gave out this AM and fell onto R side"  denies LOC, blood thinners or head injury

## 2022-12-29 NOTE — ED PROVIDER NOTE - OBJECTIVE STATEMENT
83 yo female in blue 33L presents to the ER for evaluation of left lower leg pain.  Pt awake alert oriented x3 states "I have had this sciatic type pain from my left lower back radiating down my left leg for the past week.  Yesterday the pain became more intense and today I was on my way to my physician because I have been experiencing congestion in my chest and my left leg gave out because of the pain and I fell on my right side.  I was able to get back up and walk back to my apartment and had my son drive me to the ER. Now my pain is 6/10 constant pain to my left lower leg in the front over the shin".  Pt denies hitting head or LOC at time of fall.  Denies  chest pains, shortness of breath, dizziness.  Denies fevers and chills. DEnies loss of bowel or bladder function.  Pt able to weight bear and ambualte with steady gait.

## 2022-12-29 NOTE — ED PROVIDER NOTE - CLINICAL SUMMARY MEDICAL DECISION MAKING FREE TEXT BOX
one week of LLE pain - likely sciatic-  pain radiating down LLE to left anterior leg,  no weakness, no loss of bowel /bladder funtion, no fever

## 2022-12-29 NOTE — ED PROVIDER NOTE - CROS ED NEURO ALL NEG
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 She was previously admitted 8/22/21-9/24/21 with brain and paraspinal abscesses s/p surgical drainage of brain abscess 8/26/21. She was managed with antibiotics and steroid taper, and steroids were concluded 9/24/21. She presented to the ED 10/12 with SOB, chills, BLE pain/edema and blood glucose of 648 mg/dL.     Recent events/ROS: No complaints this AM. Was drinking juice yesterday, but BG were good at dinner and hs. Will monitor.    Current hospital regimen for diabetes: Lantus 20 units nightly, Humalog 9 units with meals + correction and 4 units nightly snack dose with correction    Previous regimen at discharge 9/24/21: Lantus 16 units HS and Humalog 8/8/6 with correction and 5 units for HS snack     Current oral intake status: Consistent Carb Moderate (45-75 Gm/meal), Sodium 2 Gm (low Sodium); **nurse Alert** Diet      Blood sugars while hospitalized    Recent Labs   Lab 10/22/21  1315 10/22/21  1758 10/22/21  2155 10/23/21  0956   GLUCOSE BEDSIDE 211* 130* 150* 224*       Patient has Secondary Diabetes Mellitus from Pancreatic Surgery, uncontrolled. Patient was diagnosed in 2009. Home regimen includes Toujeo 25 units and Humalog 5 units AC and 10 units if >250. Blood sugars average 224. She has had no hypoglycemia lately and has good hypoglycemia awareness.    Patient sees Dr. López for diabetes management as an outpatient, last dose changes 10/5/21.      Physical Exam:  Visit Vitals  /68   Pulse 89   Temp 98.5 °F (36.9 °C) (Oral)   Resp 19   Ht 5' 6\" (1.676 m)   Wt 70.8 kg (156 lb 1.4 oz)   LMP  (LMP Unknown)   SpO2 97%   BMI 25.19 kg/m²   Constitutional: Resting in bed, well nourished female in NAD.  Eyes: closed   ENT: dry lips and oral mucosa  Respiratory: non-labored.  GI: non-distended.   Skin: warm, dry without rash   Psych: resting  Musculoskeletal: HELTON x 4   Neuro: no tremor.      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 We will monitor for now, as BG were improved later yesterday. Advised not to drink juice throughout the day.  - continue Lantus 20 units nightly. Do not hold, risk for DKA. Please contact endocrine with concerns    - continue Humalog 9 units TID with correction 1:50>150  - Continue nightly snack dose of humalog 4 units with correction - hold scheduled 4 units if not eating snack or if BG <100  - Check BG AC/HS and PRN  - Continue hypoglycemia protocol PRN   - Continue consistent carb diet       [facility]: TBD  Dispo: discharge on insulin, final doses TBD. Follow up with Dr. López 10/27/21 at 1 pm.     We will continue to follow and titrate/manage medications as appropriate.     Case discussed and plan developed with attending endocrinologist    Dusty Lewis, NP    Resting in bed, tired appearing.   No accessory muscle use.   No tremors.   No visible rashes on exposed skin.       No acute issues.       I have personally seen and examined the patient with NP.   History, lab work, medications were reviewed.   Assessment and plan were  developed and discussed with NP.       Negra Langley MD                         negative...

## 2022-12-29 NOTE — ED PROVIDER NOTE - NS ED ATTENDING STATEMENT MOD
This was a shared visit with the SAE. I reviewed and verified the documentation and independently performed the documented:

## 2022-12-29 NOTE — ED ADULT NURSE NOTE - NSIMPLEMENTINTERV_GEN_ALL_ED
Implemented All Fall Risk Interventions:  Morris to call system. Call bell, personal items and telephone within reach. Instruct patient to call for assistance. Room bathroom lighting operational. Non-slip footwear when patient is off stretcher. Physically safe environment: no spills, clutter or unnecessary equipment. Stretcher in lowest position, wheels locked, appropriate side rails in place. Provide visual cue, wrist band, yellow gown, etc. Monitor gait and stability. Monitor for mental status changes and reorient to person, place, and time. Review medications for side effects contributing to fall risk. Reinforce activity limits and safety measures with patient and family.

## 2023-01-06 ENCOUNTER — RX RENEWAL (OUTPATIENT)
Age: 83
End: 2023-01-06

## 2023-01-26 ENCOUNTER — RX RENEWAL (OUTPATIENT)
Age: 83
End: 2023-01-26

## 2023-01-26 RX ORDER — BUTALBITAL, ACETAMINOPHEN AND CAFFEINE 325; 50; 40 MG/1; MG/1; MG/1
50-325-40 TABLET ORAL
Qty: 120 | Refills: 0 | Status: ACTIVE | COMMUNITY
Start: 2018-11-23 | End: 1900-01-01

## 2023-03-05 ENCOUNTER — INPATIENT (INPATIENT)
Facility: HOSPITAL | Age: 83
LOS: 3 days | Discharge: HOME | DRG: 287 | End: 2023-03-09
Attending: INTERNAL MEDICINE | Admitting: INTERNAL MEDICINE
Payer: MEDICARE

## 2023-03-05 VITALS
HEART RATE: 91 BPM | DIASTOLIC BLOOD PRESSURE: 78 MMHG | TEMPERATURE: 99 F | SYSTOLIC BLOOD PRESSURE: 148 MMHG | RESPIRATION RATE: 17 BRPM

## 2023-03-05 DIAGNOSIS — Z98.89 OTHER SPECIFIED POSTPROCEDURAL STATES: Chronic | ICD-10-CM

## 2023-03-05 DIAGNOSIS — R07.9 CHEST PAIN, UNSPECIFIED: ICD-10-CM

## 2023-03-05 LAB
ALBUMIN SERPL ELPH-MCNC: 4.5 G/DL — SIGNIFICANT CHANGE UP (ref 3.3–5)
ALP SERPL-CCNC: 69 U/L — SIGNIFICANT CHANGE UP (ref 40–120)
ALT FLD-CCNC: 12 U/L — SIGNIFICANT CHANGE UP (ref 10–45)
ANION GAP SERPL CALC-SCNC: 13 MMOL/L — SIGNIFICANT CHANGE UP (ref 5–17)
AST SERPL-CCNC: 16 U/L — SIGNIFICANT CHANGE UP (ref 10–40)
BASOPHILS # BLD AUTO: 0.05 K/UL — SIGNIFICANT CHANGE UP (ref 0–0.2)
BASOPHILS NFR BLD AUTO: 0.6 % — SIGNIFICANT CHANGE UP (ref 0–2)
BILIRUB SERPL-MCNC: 0.4 MG/DL — SIGNIFICANT CHANGE UP (ref 0.2–1.2)
BUN SERPL-MCNC: 18 MG/DL — SIGNIFICANT CHANGE UP (ref 7–23)
CALCIUM SERPL-MCNC: 9.9 MG/DL — SIGNIFICANT CHANGE UP (ref 8.4–10.5)
CHLORIDE SERPL-SCNC: 104 MMOL/L — SIGNIFICANT CHANGE UP (ref 96–108)
CO2 SERPL-SCNC: 28 MMOL/L — SIGNIFICANT CHANGE UP (ref 22–31)
CREAT SERPL-MCNC: 0.71 MG/DL — SIGNIFICANT CHANGE UP (ref 0.5–1.3)
EGFR: 85 ML/MIN/1.73M2 — SIGNIFICANT CHANGE UP
EOSINOPHIL # BLD AUTO: 0.04 K/UL — SIGNIFICANT CHANGE UP (ref 0–0.5)
EOSINOPHIL NFR BLD AUTO: 0.5 % — SIGNIFICANT CHANGE UP (ref 0–6)
GLUCOSE SERPL-MCNC: 96 MG/DL — SIGNIFICANT CHANGE UP (ref 70–99)
HCT VFR BLD CALC: 40.6 % — SIGNIFICANT CHANGE UP (ref 34.5–45)
HGB BLD-MCNC: 13.1 G/DL — SIGNIFICANT CHANGE UP (ref 11.5–15.5)
IMM GRANULOCYTES NFR BLD AUTO: 0.4 % — SIGNIFICANT CHANGE UP (ref 0–0.9)
LYMPHOCYTES # BLD AUTO: 1.19 K/UL — SIGNIFICANT CHANGE UP (ref 1–3.3)
LYMPHOCYTES # BLD AUTO: 14.9 % — SIGNIFICANT CHANGE UP (ref 13–44)
MAGNESIUM SERPL-MCNC: 1.6 MG/DL — SIGNIFICANT CHANGE UP (ref 1.6–2.6)
MCHC RBC-ENTMCNC: 31.2 PG — SIGNIFICANT CHANGE UP (ref 27–34)
MCHC RBC-ENTMCNC: 32.3 GM/DL — SIGNIFICANT CHANGE UP (ref 32–36)
MCV RBC AUTO: 96.7 FL — SIGNIFICANT CHANGE UP (ref 80–100)
MONOCYTES # BLD AUTO: 0.41 K/UL — SIGNIFICANT CHANGE UP (ref 0–0.9)
MONOCYTES NFR BLD AUTO: 5.1 % — SIGNIFICANT CHANGE UP (ref 2–14)
NEUTROPHILS # BLD AUTO: 6.28 K/UL — SIGNIFICANT CHANGE UP (ref 1.8–7.4)
NEUTROPHILS NFR BLD AUTO: 78.5 % — HIGH (ref 43–77)
NRBC # BLD: 0 /100 WBCS — SIGNIFICANT CHANGE UP (ref 0–0)
PHOSPHATE SERPL-MCNC: 3.6 MG/DL — SIGNIFICANT CHANGE UP (ref 2.5–4.5)
PLATELET # BLD AUTO: 192 K/UL — SIGNIFICANT CHANGE UP (ref 150–400)
POTASSIUM SERPL-MCNC: 3.3 MMOL/L — LOW (ref 3.5–5.3)
POTASSIUM SERPL-SCNC: 3.3 MMOL/L — LOW (ref 3.5–5.3)
PROT SERPL-MCNC: 6.8 G/DL — SIGNIFICANT CHANGE UP (ref 6–8.3)
RBC # BLD: 4.2 M/UL — SIGNIFICANT CHANGE UP (ref 3.8–5.2)
RBC # FLD: 13.5 % — SIGNIFICANT CHANGE UP (ref 10.3–14.5)
SARS-COV-2 RNA SPEC QL NAA+PROBE: SIGNIFICANT CHANGE UP
SODIUM SERPL-SCNC: 145 MMOL/L — SIGNIFICANT CHANGE UP (ref 135–145)
TROPONIN T, HIGH SENSITIVITY RESULT: 19 NG/L — SIGNIFICANT CHANGE UP (ref 0–51)
TROPONIN T, HIGH SENSITIVITY RESULT: 20 NG/L — SIGNIFICANT CHANGE UP (ref 0–51)
WBC # BLD: 8 K/UL — SIGNIFICANT CHANGE UP (ref 3.8–10.5)
WBC # FLD AUTO: 8 K/UL — SIGNIFICANT CHANGE UP (ref 3.8–10.5)

## 2023-03-05 PROCEDURE — 99222 1ST HOSP IP/OBS MODERATE 55: CPT

## 2023-03-05 PROCEDURE — 71046 X-RAY EXAM CHEST 2 VIEWS: CPT | Mod: 26

## 2023-03-05 PROCEDURE — 99285 EMERGENCY DEPT VISIT HI MDM: CPT | Mod: CS

## 2023-03-05 RX ORDER — ATORVASTATIN CALCIUM 80 MG/1
1 TABLET, FILM COATED ORAL
Qty: 0 | Refills: 0 | DISCHARGE

## 2023-03-05 NOTE — ED ADULT NURSE REASSESSMENT NOTE - NS ED NURSE REASSESS COMMENT FT1
VSS. Pt resting comfortable in bed. Pending bed assignment, pt aware. All questions answered. Has no complaints at this time.  Side rails up, bed in lowest position, oriented to call bell, safety and comfort measures maintained.

## 2023-03-05 NOTE — ED ADULT NURSE NOTE - OBJECTIVE STATEMENT
Pleasantly cooperative female with reports of chest pain presenting here for an evaluation. Pain substernal, nonradiating, and intermittent in nature. Nondiaphoretic at present time. Respirations even, unlabored, and without increased work of breathing. No GI complaints offered. No reports of chest pain at present time.

## 2023-03-05 NOTE — H&P ADULT - NSHPPHYSICALEXAM_GEN_ALL_CORE
PHYSICAL EXAM:   GENERAL: Alert. Not confused. No acute distress. Not thin. Not cachectic. Not obese.  HEAD:  Atraumatic. Normocephalic.  EYES: EOMI. PERRLA. Normal conjunctiva/sclera.  ENT: Neck supple. No JVD. Moist oral mucosa. Not edentulous. No thrush.  LYMPH: Normal supraclavicular/cervical lymph nodes.   CARDIAC: Not tachy, Not trenton. Regular rhythm. Not irregularly irregular. S1. S2. No murmur. No rub. No distant heart sounds.  LUNG/CHEST: CTAB. BS equal bilaterally. No wheezes. No rales. No rhonchi.  ABDOMEN: Soft. No tenderness. No distension. No fluid wave. Normal bowel sounds.  BACK: No midline/vertebral tenderness. No flank tenderness.  VASCULAR: +2 b/l radial or ulnar pulses. Palpable DP pulses.  EXTREMITIES:  No clubbing. No cyanosis. No edema. Moving all 4.  NEUROLOGY: A&Ox3. Non-focal exam. Cranial nerves intact. Normal speech. Sensation intact.  PSYCH: Normal behavior. Normal affect.  SKIN: No jaundice. No erythema. No rash/lesion.  ICU Vital Signs Last 24 Hrs  T(C): 36.9 (06 Mar 2023 00:54), Max: 37.1 (05 Mar 2023 19:35)  T(F): 98.5 (06 Mar 2023 00:54), Max: 98.7 (05 Mar 2023 19:35)  HR: 83 (06 Mar 2023 00:54) (83 - 91)  BP: 131/67 (06 Mar 2023 00:54) (131/67 - 161/70)  BP(mean): --  ABP: --  ABP(mean): --  RR: 18 (06 Mar 2023 00:54) (17 - 18)  SpO2: 96% (06 Mar 2023 00:54) (96% - 99%)    O2 Parameters below as of 06 Mar 2023 00:54  Patient On (Oxygen Delivery Method): room air          I&O's Summary

## 2023-03-05 NOTE — H&P ADULT - HISTORY OF PRESENT ILLNESS
82F c hx htn, ue dvt, pw chest pain.    Pt reports 2 days of substernal chest pressure, like "someone is sitting on my chest". worse with palpation and lying flat. Not exertional or pleuritic. No radiation. But associated with palpitations, diaphoresis. Does not exercise. last stress test was 10 years ago reportedly normal., denies fevers, chills URI symptoms.

## 2023-03-05 NOTE — ED PROVIDER NOTE - PROGRESS NOTE DETAILS
Ana Cristina Gunn DO (PGY2): Patient signed out to me by day team.  Patient to be admitted to medicine with telemetry for cardiac stress test and CT coronary for unstable angina.  Troponin 19>20.  Spoke with Dr. Stephens will admit to her service.  Patient's cardiologist is Dr. Isaac cuba.

## 2023-03-05 NOTE — ED PROVIDER NOTE - CLINICAL SUMMARY MEDICAL DECISION MAKING FREE TEXT BOX
82-year-old female with a past medical history of hypertension, hyperlipidemia, GERD presenting with chest pain described as pressure, worse with exertion, diaphoresis with exertion. Denies shortness of breath, nausea, vomiting, radiation of pain. Patient had stress test a few years ago. Patient had echocardiogram last May. ACS work-up. Patient to be admitted versus CDU for cardiac work-up. 82-year-old female with a past medical history of hypertension, hyperlipidemia, GERD presenting with chest pain described as pressure, worse with exertion, diaphoresis with exertion. Denies shortness of breath, nausea, vomiting, radiation of pain. Patient had stress test a few years ago. Patient had echocardiogram last May. ACS work-up. Patient to be admitted versus CDU for cardiac work-up.    Attending MD Barton.  Agree with above.  Pt is an 81 yo fem with pmhx of HTN, HLD, GERD who presents to ED with complaint of CP with feeling 'off' x sev'l days assoc with intermittent chest pressure.  DIL at bedside says pt has dipahoresis, chest discomfort when she ambulates.  No n/v/d/fevers/difficulty breathing.  Planned ACS eval.  Has been sev'l yrs since stress test.  Last May had an echo.  Planned admission.  Of note - pt has sinus arrhythmia without clear afib.  Endorses transient afib previously without persistence.  Tele room called and reports sinus arrhythmia without periods of afib noted.

## 2023-03-05 NOTE — H&P ADULT - NSHPLABSRESULTS_GEN_ALL_CORE
Personally reviewed old records.  Personally reviewed labs.  Personally reviewed imaging.  Personally reviewed EKG. NSR rate 76 qtc 465. frequent pac. no st or tw changes                          13.1   8.00  )-----------( 192      ( 05 Mar 2023 17:35 )             40.6       03-05    145  |  104  |  18  ----------------------------<  96  3.3<L>   |  28  |  0.71    Ca    9.9      05 Mar 2023 17:35  Phos  3.6     03-05  Mg     1.6     03-05    TPro  6.8  /  Alb  4.5  /  TBili  0.4  /  DBili  x   /  AST  16  /  ALT  12  /  AlkPhos  69  03-05            LIVER FUNCTIONS - ( 05 Mar 2023 17:35 )  Alb: 4.5 g/dL / Pro: 6.8 g/dL / ALK PHOS: 69 U/L / ALT: 12 U/L / AST: 16 U/L / GGT: x

## 2023-03-05 NOTE — ED PROVIDER NOTE - NSICDXFAMILYHX_GEN_ALL_CORE_FT
FAMILY HISTORY:  Atrial fibrillation    Sibling  Still living? No  Family history of substance abuse, Age at diagnosis: Age Unknown

## 2023-03-05 NOTE — H&P ADULT - NSHPSOCIALHISTORY_GEN_ALL_CORE
Social History:    Marital Status: (  ) , ( x ) Single, (  ) , (  ) , (  )   # of Children: 2  Lives with: (x  ) alone, (  ) children, (  ) spouse, (  ) parents, (  ) siblings, (  ) friends, (  ) other:   Occupation:     Substance Use/Illicit Drugs: (  ) never used vs other:   Tobacco Usage: (  ) never smoked, (  x) former smoker, (  ) current smoker and Total Pack-Years: 20 pk yrs  Last Alcohol Usage/Frequency/Amount/Withdrawal/Hx of Abuse:  none  Foreign travel:   Animal exposure:

## 2023-03-05 NOTE — ED PROVIDER NOTE - OBJECTIVE STATEMENT
82-year-old female with a past medical history of hypertension, GERD, hyperlipidemia presenting with chest pain. Patient is not been feeling well over the last couple of days. Having substernal tightness in her chest. Pain is nonradiating, intermittent, associated diaphoresis when exerting herself. Denies shortness of breath, nausea, vomiting, Fevers, abdominal pain.

## 2023-03-05 NOTE — ED PROVIDER NOTE - NSICDXPASTMEDICALHX_GEN_ALL_CORE_FT
PAST MEDICAL HISTORY:  BCC (basal cell carcinoma), lip currently on Coumadin, took last dose yesterday, 12/9/14, today will taking nothing, starts Lovenox tomorrow, 9/11/14 for 4 days, pt. states as per "the Hilary Berman in Dr. Coles's office"    DVT of axillary vein, chronic left     Fatty liver     GERD (gastroesophageal reflux disease)     Hodgkin lymphoma     HTN (hypertension)     Murmur     Obese     Reactive airway disease     Renal calculi

## 2023-03-05 NOTE — ED PROVIDER NOTE - ATTENDING CONTRIBUTION TO CARE
Attending MD Barton:  I performed a history and physical exam of the patient and discussed their management with the resident. I reviewed the resident's note and agree with the documented findings and plan of care. My medical decision making and observations are found above.

## 2023-03-06 DIAGNOSIS — I10 ESSENTIAL (PRIMARY) HYPERTENSION: ICD-10-CM

## 2023-03-06 DIAGNOSIS — I20.0 UNSTABLE ANGINA: ICD-10-CM

## 2023-03-06 LAB
ALBUMIN SERPL ELPH-MCNC: 4 G/DL — SIGNIFICANT CHANGE UP (ref 3.3–5)
ALP SERPL-CCNC: 60 U/L — SIGNIFICANT CHANGE UP (ref 40–120)
ALT FLD-CCNC: 11 U/L — SIGNIFICANT CHANGE UP (ref 10–45)
ANION GAP SERPL CALC-SCNC: 11 MMOL/L — SIGNIFICANT CHANGE UP (ref 5–17)
ANION GAP SERPL CALC-SCNC: 13 MMOL/L — SIGNIFICANT CHANGE UP (ref 5–17)
AST SERPL-CCNC: 15 U/L — SIGNIFICANT CHANGE UP (ref 10–40)
BASOPHILS # BLD AUTO: 0.03 K/UL — SIGNIFICANT CHANGE UP (ref 0–0.2)
BASOPHILS NFR BLD AUTO: 0.5 % — SIGNIFICANT CHANGE UP (ref 0–2)
BILIRUB SERPL-MCNC: 0.2 MG/DL — SIGNIFICANT CHANGE UP (ref 0.2–1.2)
BUN SERPL-MCNC: 13 MG/DL — SIGNIFICANT CHANGE UP (ref 7–23)
BUN SERPL-MCNC: 17 MG/DL — SIGNIFICANT CHANGE UP (ref 7–23)
CALCIUM SERPL-MCNC: 10.3 MG/DL — SIGNIFICANT CHANGE UP (ref 8.4–10.5)
CALCIUM SERPL-MCNC: 9.6 MG/DL — SIGNIFICANT CHANGE UP (ref 8.4–10.5)
CHLORIDE SERPL-SCNC: 104 MMOL/L — SIGNIFICANT CHANGE UP (ref 96–108)
CHLORIDE SERPL-SCNC: 105 MMOL/L — SIGNIFICANT CHANGE UP (ref 96–108)
CO2 SERPL-SCNC: 25 MMOL/L — SIGNIFICANT CHANGE UP (ref 22–31)
CO2 SERPL-SCNC: 27 MMOL/L — SIGNIFICANT CHANGE UP (ref 22–31)
CREAT SERPL-MCNC: 0.64 MG/DL — SIGNIFICANT CHANGE UP (ref 0.5–1.3)
CREAT SERPL-MCNC: 0.84 MG/DL — SIGNIFICANT CHANGE UP (ref 0.5–1.3)
EGFR: 70 ML/MIN/1.73M2 — SIGNIFICANT CHANGE UP
EGFR: 89 ML/MIN/1.73M2 — SIGNIFICANT CHANGE UP
EOSINOPHIL # BLD AUTO: 0.08 K/UL — SIGNIFICANT CHANGE UP (ref 0–0.5)
EOSINOPHIL NFR BLD AUTO: 1.2 % — SIGNIFICANT CHANGE UP (ref 0–6)
GLUCOSE SERPL-MCNC: 110 MG/DL — HIGH (ref 70–99)
GLUCOSE SERPL-MCNC: 130 MG/DL — HIGH (ref 70–99)
HCT VFR BLD CALC: 36.8 % — SIGNIFICANT CHANGE UP (ref 34.5–45)
HGB BLD-MCNC: 12.2 G/DL — SIGNIFICANT CHANGE UP (ref 11.5–15.5)
IMM GRANULOCYTES NFR BLD AUTO: 0.3 % — SIGNIFICANT CHANGE UP (ref 0–0.9)
LYMPHOCYTES # BLD AUTO: 1.31 K/UL — SIGNIFICANT CHANGE UP (ref 1–3.3)
LYMPHOCYTES # BLD AUTO: 19.9 % — SIGNIFICANT CHANGE UP (ref 13–44)
MAGNESIUM SERPL-MCNC: 1.6 MG/DL — SIGNIFICANT CHANGE UP (ref 1.6–2.6)
MCHC RBC-ENTMCNC: 31.5 PG — SIGNIFICANT CHANGE UP (ref 27–34)
MCHC RBC-ENTMCNC: 33.2 GM/DL — SIGNIFICANT CHANGE UP (ref 32–36)
MCV RBC AUTO: 95.1 FL — SIGNIFICANT CHANGE UP (ref 80–100)
MONOCYTES # BLD AUTO: 0.43 K/UL — SIGNIFICANT CHANGE UP (ref 0–0.9)
MONOCYTES NFR BLD AUTO: 6.5 % — SIGNIFICANT CHANGE UP (ref 2–14)
NEUTROPHILS # BLD AUTO: 4.71 K/UL — SIGNIFICANT CHANGE UP (ref 1.8–7.4)
NEUTROPHILS NFR BLD AUTO: 71.6 % — SIGNIFICANT CHANGE UP (ref 43–77)
NRBC # BLD: 0 /100 WBCS — SIGNIFICANT CHANGE UP (ref 0–0)
PHOSPHATE SERPL-MCNC: 3.7 MG/DL — SIGNIFICANT CHANGE UP (ref 2.5–4.5)
PLATELET # BLD AUTO: 173 K/UL — SIGNIFICANT CHANGE UP (ref 150–400)
POTASSIUM SERPL-MCNC: 2.9 MMOL/L — CRITICAL LOW (ref 3.5–5.3)
POTASSIUM SERPL-MCNC: 3.7 MMOL/L — SIGNIFICANT CHANGE UP (ref 3.5–5.3)
POTASSIUM SERPL-SCNC: 2.9 MMOL/L — CRITICAL LOW (ref 3.5–5.3)
POTASSIUM SERPL-SCNC: 3.7 MMOL/L — SIGNIFICANT CHANGE UP (ref 3.5–5.3)
PROT SERPL-MCNC: 5.9 G/DL — LOW (ref 6–8.3)
RBC # BLD: 3.87 M/UL — SIGNIFICANT CHANGE UP (ref 3.8–5.2)
RBC # FLD: 13.5 % — SIGNIFICANT CHANGE UP (ref 10.3–14.5)
SODIUM SERPL-SCNC: 142 MMOL/L — SIGNIFICANT CHANGE UP (ref 135–145)
SODIUM SERPL-SCNC: 143 MMOL/L — SIGNIFICANT CHANGE UP (ref 135–145)
TROPONIN T, HIGH SENSITIVITY RESULT: 19 NG/L — SIGNIFICANT CHANGE UP (ref 0–51)
TSH SERPL-MCNC: 3.95 UIU/ML — SIGNIFICANT CHANGE UP (ref 0.27–4.2)
WBC # BLD: 6.58 K/UL — SIGNIFICANT CHANGE UP (ref 3.8–10.5)
WBC # FLD AUTO: 6.58 K/UL — SIGNIFICANT CHANGE UP (ref 3.8–10.5)

## 2023-03-06 PROCEDURE — 99223 1ST HOSP IP/OBS HIGH 75: CPT

## 2023-03-06 RX ORDER — TICAGRELOR 90 MG/1
180 TABLET ORAL ONCE
Refills: 0 | Status: COMPLETED | OUTPATIENT
Start: 2023-03-06 | End: 2023-03-06

## 2023-03-06 RX ORDER — POTASSIUM CHLORIDE 20 MEQ
40 PACKET (EA) ORAL EVERY 4 HOURS
Refills: 0 | Status: DISCONTINUED | OUTPATIENT
Start: 2023-03-06 | End: 2023-03-06

## 2023-03-06 RX ORDER — PANTOPRAZOLE SODIUM 20 MG/1
40 TABLET, DELAYED RELEASE ORAL
Refills: 0 | Status: DISCONTINUED | OUTPATIENT
Start: 2023-03-06 | End: 2023-03-09

## 2023-03-06 RX ORDER — ASPIRIN/CALCIUM CARB/MAGNESIUM 324 MG
325 TABLET ORAL ONCE
Refills: 0 | Status: COMPLETED | OUTPATIENT
Start: 2023-03-06 | End: 2023-03-06

## 2023-03-06 RX ORDER — POTASSIUM CHLORIDE 20 MEQ
20 PACKET (EA) ORAL
Refills: 0 | Status: COMPLETED | OUTPATIENT
Start: 2023-03-06 | End: 2023-03-06

## 2023-03-06 RX ORDER — POTASSIUM CHLORIDE 20 MEQ
10 PACKET (EA) ORAL
Refills: 0 | Status: COMPLETED | OUTPATIENT
Start: 2023-03-06 | End: 2023-03-06

## 2023-03-06 RX ORDER — ATORVASTATIN CALCIUM 80 MG/1
80 TABLET, FILM COATED ORAL AT BEDTIME
Refills: 0 | Status: DISCONTINUED | OUTPATIENT
Start: 2023-03-06 | End: 2023-03-09

## 2023-03-06 RX ADMIN — Medication 100 MILLIEQUIVALENT(S): at 05:42

## 2023-03-06 RX ADMIN — Medication 20 MILLIEQUIVALENT(S): at 06:42

## 2023-03-06 RX ADMIN — Medication 20 MILLIEQUIVALENT(S): at 03:39

## 2023-03-06 RX ADMIN — Medication 100 MILLIEQUIVALENT(S): at 03:57

## 2023-03-06 RX ADMIN — Medication 325 MILLIGRAM(S): at 03:04

## 2023-03-06 RX ADMIN — TICAGRELOR 180 MILLIGRAM(S): 90 TABLET ORAL at 03:05

## 2023-03-06 RX ADMIN — ATORVASTATIN CALCIUM 80 MILLIGRAM(S): 80 TABLET, FILM COATED ORAL at 21:51

## 2023-03-06 RX ADMIN — PANTOPRAZOLE SODIUM 40 MILLIGRAM(S): 20 TABLET, DELAYED RELEASE ORAL at 06:42

## 2023-03-06 NOTE — CONSULT NOTE ADULT - ASSESSMENT
82 year old woman with hypertension, UE DVT with atypical chest pain.    - Admit to telemetry  - Check CTA coronaries as she has ruled out for ACS  - Check echocardiogram  - SR wtih PACs on the monitor  - Continue statin  - To follow while admitted

## 2023-03-06 NOTE — CONSULT NOTE ADULT - SUBJECTIVE AND OBJECTIVE BOX
[facility] - Moe Swain, Fer Pate Savella, [place]  Office Number: [phone]    Initial Consult Note    CHIEF COMPLAINT: Patient is a 82y old  Female who presents with a chief complaint of chest pain       HPI:  82F c hx htn, ue dvt, pw chest pain.    Pt reports 2 days of substernal chest pressure, like "someone is sitting on my chest". worse with palpation and lying flat. Not exertional or pleuritic. No radiation. But associated with palpitations, diaphoresis. Does not exercise. last stress test was 10 years ago reportedly normal., denies fevers, chills URI symptoms.     She has ruled out.  She continues to have the chest pain during my exam and interview.    No overnight events        PAST MEDICAL & SURGICAL HISTORY:  GERD (gastroesophageal reflux disease)      HTN (hypertension)      Reactive airway disease      Renal calculi      Hodgkin lymphoma      BCC (basal cell carcinoma), lip  currently on Coumadin, took last dose yesterday, 12/9/14, today will taking nothing, starts Lovenox tomorrow, 9/11/14 for 4 days, pt. states as per &quot;the NurseHilary in Dr. Coles&#x27;s office&quot;      DVT of axillary vein, chronic left      Obese      Murmur      Fatty liver      H/O lithotripsy  4/2009      H/O surgical biopsy  upper chest-7/2014          SOCIAL HISTORY:  No tobacco, ethanol, or drug abuse.    FAMILY HISTORY:  Atrial fibrillation    Family history of substance abuse (Sibling)      No family history of acute MI or sudden cardiac death.    MEDICATIONS  (STANDING):  atorvastatin 80 milliGRAM(s) Oral at bedtime  pantoprazole    Tablet 40 milliGRAM(s) Oral before breakfast    MEDICATIONS  (PRN):      Allergies    clindamycin (Other)  penicillin (Rash)    Intolerances    sulfa drugs (Other)      REVIEW OF SYSTEMS:    CONSTITUTIONAL: No weakness, fevers or chills  EYES/ENT: No visual changes;  No vertigo or throat pain   NECK: No pain or stiffness  RESPIRATORY: No cough, wheezing, hemoptysis; No shortness of breath  CARDIOVASCULAR: No chest pain or palpitations  GASTROINTESTINAL: No abdominal pain. No nausea, vomiting, or hematemesis; No diarrhea or constipation. No melena or hematochezia.  GENITOURINARY: No dysuria, frequency or hematuria  NEUROLOGICAL: No numbness or weakness  SKIN: No itching or rash  All other review of systems is negative unless indicated above    VITAL SIGNS:   Vital Signs Last 24 Hrs  T(C): 36.4 (06 Mar 2023 04:31), Max: 37.1 (05 Mar 2023 19:35)  T(F): 97.6 (06 Mar 2023 04:31), Max: 98.7 (05 Mar 2023 19:35)  HR: 85 (06 Mar 2023 04:31) (83 - 91)  BP: 162/88 (06 Mar 2023 04:31) (131/67 - 162/88)  BP(mean): --  RR: 18 (06 Mar 2023 04:31) (17 - 18)  SpO2: 95% (06 Mar 2023 04:31) (95% - 99%)    Parameters below as of 06 Mar 2023 04:31  Patient On (Oxygen Delivery Method): room air        I&O's Summary      On Exam:    Constitutional: NAD, alert and oriented x 3  Lungs:  Non-labored, breath sounds are clear bilaterally, No wheezing, rales or rhonchi  Cardiovascular: RRR.  S1 and S2 positive.  No murmurs, rubs, gallops or clicks  Gastrointestinal: Bowel Sounds present, soft, nontender.   Lymph: No peripheral edema. No cervical lymphadenopathy.  Neurological: Alert, no focal deficits  Skin: No rashes or ulcers   Psych:  Mood & affect appropriate.    LABS: All Labs Reviewed:                        12.2   6.58  )-----------( 173      ( 06 Mar 2023 02:45 )             36.8                         13.1   8.00  )-----------( 192      ( 05 Mar 2023 17:35 )             40.6     06 Mar 2023 02:45    142    |  104    |  17     ----------------------------<  110    2.9     |  27     |  0.84   05 Mar 2023 17:35    145    |  104    |  18     ----------------------------<  96     3.3     |  28     |  0.71     Ca    9.6        06 Mar 2023 02:45  Ca    9.9        05 Mar 2023 17:35  Phos  3.7       06 Mar 2023 02:45  Phos  3.6       05 Mar 2023 17:35  Mg     1.6       06 Mar 2023 02:45  Mg     1.6       05 Mar 2023 17:35    TPro  5.9    /  Alb  4.0    /  TBili  0.2    /  DBili  x      /  AST  15     /  ALT  11     /  AlkPhos  60     06 Mar 2023 02:45  TPro  6.8    /  Alb  4.5    /  TBili  0.4    /  DBili  x      /  AST  16     /  ALT  12     /  AlkPhos  69     05 Mar 2023 17:35          Blood Culture:         RADIOLOGY:    EKG:

## 2023-03-07 LAB
ANION GAP SERPL CALC-SCNC: 10 MMOL/L — SIGNIFICANT CHANGE UP (ref 5–17)
BUN SERPL-MCNC: 12 MG/DL — SIGNIFICANT CHANGE UP (ref 7–23)
CALCIUM SERPL-MCNC: 9.4 MG/DL — SIGNIFICANT CHANGE UP (ref 8.4–10.5)
CHLORIDE SERPL-SCNC: 107 MMOL/L — SIGNIFICANT CHANGE UP (ref 96–108)
CO2 SERPL-SCNC: 26 MMOL/L — SIGNIFICANT CHANGE UP (ref 22–31)
CREAT SERPL-MCNC: 0.57 MG/DL — SIGNIFICANT CHANGE UP (ref 0.5–1.3)
EGFR: 91 ML/MIN/1.73M2 — SIGNIFICANT CHANGE UP
GLUCOSE SERPL-MCNC: 109 MG/DL — HIGH (ref 70–99)
POTASSIUM SERPL-MCNC: 3.1 MMOL/L — LOW (ref 3.5–5.3)
POTASSIUM SERPL-SCNC: 3.1 MMOL/L — LOW (ref 3.5–5.3)
SODIUM SERPL-SCNC: 143 MMOL/L — SIGNIFICANT CHANGE UP (ref 135–145)
TROPONIN T, HIGH SENSITIVITY RESULT: 20 NG/L — SIGNIFICANT CHANGE UP (ref 0–51)

## 2023-03-07 PROCEDURE — 93356 MYOCRD STRAIN IMG SPCKL TRCK: CPT

## 2023-03-07 PROCEDURE — 93010 ELECTROCARDIOGRAM REPORT: CPT

## 2023-03-07 PROCEDURE — 75574 CT ANGIO HRT W/3D IMAGE: CPT | Mod: 26

## 2023-03-07 PROCEDURE — 93306 TTE W/DOPPLER COMPLETE: CPT | Mod: 26

## 2023-03-07 PROCEDURE — 99232 SBSQ HOSP IP/OBS MODERATE 35: CPT

## 2023-03-07 RX ORDER — METHENAMINE MANDELATE 1 G
1 TABLET ORAL
Qty: 0 | Refills: 0 | DISCHARGE

## 2023-03-07 RX ORDER — LOSARTAN/HYDROCHLOROTHIAZIDE 100MG-25MG
1 TABLET ORAL
Qty: 0 | Refills: 0 | DISCHARGE

## 2023-03-07 RX ORDER — OMEPRAZOLE 10 MG/1
1 CAPSULE, DELAYED RELEASE ORAL
Qty: 0 | Refills: 0 | DISCHARGE

## 2023-03-07 RX ORDER — ROSUVASTATIN CALCIUM 5 MG/1
1 TABLET ORAL
Qty: 0 | Refills: 0 | DISCHARGE

## 2023-03-07 RX ORDER — METOPROLOL TARTRATE 50 MG
5 TABLET ORAL ONCE
Refills: 0 | Status: DISCONTINUED | OUTPATIENT
Start: 2023-03-07 | End: 2023-03-07

## 2023-03-07 RX ORDER — ASCORBIC ACID 60 MG
1 TABLET,CHEWABLE ORAL
Qty: 0 | Refills: 0 | DISCHARGE

## 2023-03-07 RX ORDER — POTASSIUM CHLORIDE 20 MEQ
40 PACKET (EA) ORAL EVERY 4 HOURS
Refills: 0 | Status: COMPLETED | OUTPATIENT
Start: 2023-03-07 | End: 2023-03-07

## 2023-03-07 RX ORDER — METOPROLOL TARTRATE 50 MG
50 TABLET ORAL ONCE
Refills: 0 | Status: COMPLETED | OUTPATIENT
Start: 2023-03-07 | End: 2023-03-07

## 2023-03-07 RX ORDER — MORPHINE SULFATE 50 MG/1
2 CAPSULE, EXTENDED RELEASE ORAL ONCE
Refills: 0 | Status: DISCONTINUED | OUTPATIENT
Start: 2023-03-07 | End: 2023-03-09

## 2023-03-07 RX ORDER — CHLORHEXIDINE GLUCONATE 213 G/1000ML
1 SOLUTION TOPICAL
Refills: 0 | Status: DISCONTINUED | OUTPATIENT
Start: 2023-03-07 | End: 2023-03-09

## 2023-03-07 RX ADMIN — CHLORHEXIDINE GLUCONATE 1 APPLICATION(S): 213 SOLUTION TOPICAL at 11:17

## 2023-03-07 RX ADMIN — ATORVASTATIN CALCIUM 80 MILLIGRAM(S): 80 TABLET, FILM COATED ORAL at 21:07

## 2023-03-07 RX ADMIN — Medication 40 MILLIEQUIVALENT(S): at 09:19

## 2023-03-07 RX ADMIN — Medication 50 MILLIGRAM(S): at 14:01

## 2023-03-07 RX ADMIN — PANTOPRAZOLE SODIUM 40 MILLIGRAM(S): 20 TABLET, DELAYED RELEASE ORAL at 05:25

## 2023-03-07 RX ADMIN — Medication 40 MILLIEQUIVALENT(S): at 11:12

## 2023-03-07 NOTE — PATIENT PROFILE ADULT - FALL HARM RISK - HARM RISK INTERVENTIONS
Communicate Risk of Fall with Harm to all staff/Monitor gait and stability/Reinforce activity limits and safety measures with patient and family/Sit up slowly, dangle for a short time, stand at bedside before walking/Tailored Fall Risk Interventions/Use of alarms - bed, chair and/or voice tab/Visual Cue: Yellow wristband and red socks/Bed in lowest position, wheels locked, appropriate side rails in place/Call bell, personal items and telephone in reach/Instruct patient to call for assistance before getting out of bed or chair/Non-slip footwear when patient is out of bed/[place] to call system/Physically safe environment - no spills, clutter or unnecessary equipment/Purposeful Proactive Rounding/Room/bathroom lighting operational, light cord in reach

## 2023-03-07 NOTE — PHARMACOTHERAPY INTERVENTION NOTE - COMMENTS
Confirmed home medications with patient and pharmacy, updated in Outpatient Medication Review.     Home Medications Confirmed:   Losartan/Hydrochlorothiazide 100/12.5 mg 1 tablet by mouth once daily  Omeprazole 40 mg 1 tablet by mouth once daily   Rosuvastatin 40 mg 1 tablet by mouth once daily   Advil 200 mg as needed for headaches - does not use often     Home Medications Changed:   Methenamine Hippurate 1 g 1 tablet by mouth twice daily   Home Medications Added:   Vitamin C 500 mg 1 tablet by mouth twice daily with methenamine hippurate     All home medications appropriately reconciled.     Sheri Benito, PharmD Candidate  Polly Smith, PharmD, [facility]  Clinical Pharmacy Specialist  [phone] or Teams

## 2023-03-08 ENCOUNTER — APPOINTMENT (OUTPATIENT)
Dept: PULMONOLOGY | Facility: CLINIC | Age: 83
End: 2023-03-08

## 2023-03-08 ENCOUNTER — TRANSCRIPTION ENCOUNTER (OUTPATIENT)
Age: 83
End: 2023-03-08

## 2023-03-08 LAB
ANION GAP SERPL CALC-SCNC: 11 MMOL/L — SIGNIFICANT CHANGE UP (ref 5–17)
BUN SERPL-MCNC: 12 MG/DL — SIGNIFICANT CHANGE UP (ref 7–23)
CALCIUM SERPL-MCNC: 9.3 MG/DL — SIGNIFICANT CHANGE UP (ref 8.4–10.5)
CHLORIDE SERPL-SCNC: 109 MMOL/L — HIGH (ref 96–108)
CO2 SERPL-SCNC: 24 MMOL/L — SIGNIFICANT CHANGE UP (ref 22–31)
CREAT SERPL-MCNC: 0.64 MG/DL — SIGNIFICANT CHANGE UP (ref 0.5–1.3)
EGFR: 89 ML/MIN/1.73M2 — SIGNIFICANT CHANGE UP
GLUCOSE SERPL-MCNC: 105 MG/DL — HIGH (ref 70–99)
MRSA PCR RESULT.: SIGNIFICANT CHANGE UP
POTASSIUM SERPL-MCNC: 3.7 MMOL/L — SIGNIFICANT CHANGE UP (ref 3.5–5.3)
POTASSIUM SERPL-SCNC: 3.7 MMOL/L — SIGNIFICANT CHANGE UP (ref 3.5–5.3)
S AUREUS DNA NOSE QL NAA+PROBE: SIGNIFICANT CHANGE UP
SODIUM SERPL-SCNC: 144 MMOL/L — SIGNIFICANT CHANGE UP (ref 135–145)

## 2023-03-08 PROCEDURE — 99232 SBSQ HOSP IP/OBS MODERATE 35: CPT

## 2023-03-08 PROCEDURE — 99152 MOD SED SAME PHYS/QHP 5/>YRS: CPT

## 2023-03-08 PROCEDURE — 93454 CORONARY ARTERY ANGIO S&I: CPT | Mod: 26

## 2023-03-08 RX ORDER — ASPIRIN/CALCIUM CARB/MAGNESIUM 324 MG
81 TABLET ORAL DAILY
Refills: 0 | Status: DISCONTINUED | OUTPATIENT
Start: 2023-03-08 | End: 2023-03-09

## 2023-03-08 RX ORDER — SODIUM CHLORIDE 9 MG/ML
250 INJECTION INTRAMUSCULAR; INTRAVENOUS; SUBCUTANEOUS
Refills: 0 | Status: DISCONTINUED | OUTPATIENT
Start: 2023-03-08 | End: 2023-03-09

## 2023-03-08 RX ORDER — LOSARTAN POTASSIUM 100 MG/1
100 TABLET, FILM COATED ORAL DAILY
Refills: 0 | Status: DISCONTINUED | OUTPATIENT
Start: 2023-03-08 | End: 2023-03-09

## 2023-03-08 RX ORDER — SODIUM CHLORIDE 9 MG/ML
250 INJECTION INTRAMUSCULAR; INTRAVENOUS; SUBCUTANEOUS ONCE
Refills: 0 | Status: COMPLETED | OUTPATIENT
Start: 2023-03-08 | End: 2023-03-08

## 2023-03-08 RX ORDER — ASPIRIN/CALCIUM CARB/MAGNESIUM 324 MG
1 TABLET ORAL
Qty: 0 | Refills: 0 | DISCHARGE
Start: 2023-03-08

## 2023-03-08 RX ORDER — IBUPROFEN 200 MG
1 TABLET ORAL
Qty: 0 | Refills: 0 | DISCHARGE

## 2023-03-08 RX ADMIN — PANTOPRAZOLE SODIUM 40 MILLIGRAM(S): 20 TABLET, DELAYED RELEASE ORAL at 06:05

## 2023-03-08 RX ADMIN — SODIUM CHLORIDE 500 MILLILITER(S): 9 INJECTION INTRAMUSCULAR; INTRAVENOUS; SUBCUTANEOUS at 10:44

## 2023-03-08 RX ADMIN — LOSARTAN POTASSIUM 100 MILLIGRAM(S): 100 TABLET, FILM COATED ORAL at 16:32

## 2023-03-08 RX ADMIN — CHLORHEXIDINE GLUCONATE 1 APPLICATION(S): 213 SOLUTION TOPICAL at 05:18

## 2023-03-08 RX ADMIN — ATORVASTATIN CALCIUM 80 MILLIGRAM(S): 80 TABLET, FILM COATED ORAL at 21:28

## 2023-03-08 NOTE — DISCHARGE NOTE PROVIDER - HOSPITAL COURSE
82F c hx htn, ue dvt, pw unstable angina  Unstable angina. tele  - tte  - trend CE  - Coronary CTA with Densely calcified plaque at the RCA ostium and a severe stenosis cannot be excluded. s/p cardiac cath diagnostic.   - no sxs acute ischemia  - remains sr  - no vol ol  - echo with normal LV function  - cont statin  - start asa 82    HTN (hypertension). holding losartan hctz- d/w cardiology, resumed 3/8    Patient medically clear per Dr. Stephens/cardiology on 3/9.    82F c hx htn, ue dvt, pw unstable angina  Unstable angina. tele  - tte  - trend CE  - Coronary CTA with Densely calcified plaque at the RCA ostium and a severe stenosis cannot be excluded. s/p cardiac cath diagnostic.   - no sxs acute ischemia  - remains sr  - no vol ol  - echo with normal LV function  - cont statin  - start asa 82    HTN (hypertension). holding losartan hctz- d/w cardiology, resumed 3/8    Hypokalemia- supplemented. Per cardiology, recommended to dc on potassium daily.     Patient medically clear per Dr. Stephens/cardiology on 3/9.    82F c hx htn, ue dvt, pw unstable angina  Unstable angina. tele  - tte  - trend CE  - Coronary CTA with Densely calcified plaque at the RCA ostium and a severe stenosis cannot be excluded. s/p cardiac cath diagnostic.   - no sxs acute ischemia  - remains sr  - no vol ol  - echo with normal LV function  - cont statin  - start asa 82    HTN (hypertension). holding losartan hctz- d/w cardiology, resumed 3/8    Hypokalemia- supplemented. Per cardiology, recommended to dc on potassium daily.     Patient medically clear per Dr. Stephens/cardiology on 3/10.    82F c hx htn, ue dvt, pw unstable angina  Unstable angina. tele  - tte  - trend CE  - Coronary CTA with Densely calcified plaque at the RCA ostium and a severe stenosis cannot be excluded. s/p diagnostic cardiac cath .   - no sxs acute ischemia  - remains sr  - no vol ol  - echo with normal LV function  - cont statin  - start asa 82    HTN (hypertension). holding losartan hctz- d/w cardiology, resumed 3/8    Hypokalemia- supplemented. Per cardiology, recommended to dc on potassium daily.     Patient medically clear per Dr. Stephens/cardiology on 3/10.

## 2023-03-08 NOTE — PROGRESS NOTE ADULT - PROBLEM SELECTOR PLAN 1
- no sxs acute ischemia  - remains sr  - no vol ol  - echo with normal LV function  - Coronary CTA with Densely calcified plaque at the RCA ostium and a severe stenosis cannot be excluded. Given this and her symptoms, will plan for cardiac cath today.
- tele  - tte  - trend CE  - CT coronary
- tele  - tte  - trend CE  - CT coronary

## 2023-03-08 NOTE — DISCHARGE NOTE PROVIDER - NSDCMRMEDTOKEN_GEN_ALL_CORE_FT
Advil 200 mg oral tablet: 1 tab(s) orally , As Needed  losartan-hydrochlorothiazide 100mg-12.5mg oral tablet: 1 tab(s) orally once a day  methenamine hippurate 1 g oral tablet: 1 tab(s) orally 2 times a day  omeprazole 40 mg oral delayed release capsule: 1 cap(s) orally once a day  rosuvastatin 40 mg oral tablet: 1 tab(s) orally once a day (at bedtime)  Vitamin C 500 mg oral tablet: 1 tab(s) orally 2 times a day   aspirin 81 mg oral delayed release tablet: 1 tab(s) orally once a day  losartan-hydrochlorothiazide 100mg-12.5mg oral tablet: 1 tab(s) orally once a day  methenamine hippurate 1 g oral tablet: 1 tab(s) orally 2 times a day  omeprazole 40 mg oral delayed release capsule: 1 cap(s) orally once a day  rosuvastatin 40 mg oral tablet: 1 tab(s) orally once a day (at bedtime)  Vitamin C 500 mg oral tablet: 1 tab(s) orally 2 times a day   aspirin 81 mg oral delayed release tablet: 1 tab(s) orally once a day  losartan-hydrochlorothiazide 100mg-12.5mg oral tablet: 1 tab(s) orally once a day  methenamine hippurate 1 g oral tablet: 1 tab(s) orally 2 times a day  omeprazole 40 mg oral delayed release capsule: 1 cap(s) orally once a day  potassium chloride 10 mEq oral tablet, extended release: 1 tab(s) orally once a day   rosuvastatin 40 mg oral tablet: 1 tab(s) orally once a day (at bedtime)  Vitamin C 500 mg oral tablet: 1 tab(s) orally 2 times a day

## 2023-03-08 NOTE — DISCHARGE NOTE PROVIDER - NSDCCPCAREPLAN_GEN_ALL_CORE_FT
PRINCIPAL DISCHARGE DIAGNOSIS  Diagnosis: Unstable angina  Assessment and Plan of Treatment: CT coronary was abnormal therefore a cardiac cath was performed and was diagnostic (no stents needed).   Cardiac catheterization or coronary angiogram is done to understand how the heart is working and to look at the coronary arteries which supply oxygen to the heart muscles, to look at the heart chambers and the valves in the heart.  The doctor uses your groin or your arm to do the procedure  Your doctor will let you know when you can drive and do your usual physical activities  You will need to see your doctor within one week after discharge  Call your doctor if the insertion site bleeds a lot, if you get a fever or have pain, swelling, or redness where the tube went in, or if your leg feels weak, numb, or cold  Continue your medications. No plavix required as there were no stents placed.   No heavy lifting, strenuous activity, bending, straining or unnecessary stair climbing for 2 weeks. No sex for 1 week.  No driving for 2 days. You may shower 24 hours following procedure but avoid baths and swimming for 1 week. Check groin site for bleeding and/or swelling daily following procedure. Call your doctor/cardiologist immediately for bleeding or swelling or if you have increased/persistent pain, fever/chills, or drainage at the site. Follow up with your cardiologist in 1- 2 weeks. You may call [facility] at [phone] or [phone] after office hours and weekends with any questions or concerns following your procedure.        SECONDARY DISCHARGE DIAGNOSES  Diagnosis: HTN (hypertension)  Assessment and Plan of Treatment: Continue blood pressure medication regimen as directed. Monitor for any visual changes, headaches or dizziness.  Monitor blood pressure regularly.  Follow up with your PCP for further management for high blood pressure. Continue to eat Low sodium and fat diet       PRINCIPAL DISCHARGE DIAGNOSIS  Diagnosis: Unstable angina  Assessment and Plan of Treatment: CT coronary was abnormal therefore a cardiac cath was performed and was diagnostic (no stents needed).   Cardiac catheterization or coronary angiogram is done to understand how the heart is working and to look at the coronary arteries which supply oxygen to the heart muscles, to look at the heart chambers and the valves in the heart.  The doctor uses your groin or your arm to do the procedure  Your doctor will let you know when you can drive and do your usual physical activities  You will need to see your doctor within one week after discharge  Call your doctor if the insertion site bleeds a lot, if you get a fever or have pain, swelling, or redness where the tube went in, or if your leg feels weak, numb, or cold  Continue your medications. No plavix required as there were no stents placed.   Continue aspirin  No heavy lifting, strenuous activity, bending, straining or unnecessary stair climbing for 2 weeks. No sex for 1 week.  No driving for 2 days. You may shower 24 hours following procedure but avoid baths and swimming for 1 week. Check groin site for bleeding and/or swelling daily following procedure. Call your doctor/cardiologist immediately for bleeding or swelling or if you have increased/persistent pain, fever/chills, or drainage at the site. Follow up with your cardiologist in 1- 2 weeks. You may call [facility] at [phone] or [phone] after office hours and weekends with any questions or concerns following your procedure.        SECONDARY DISCHARGE DIAGNOSES  Diagnosis: HTN (hypertension)  Assessment and Plan of Treatment: Continue blood pressure medication regimen as directed. Monitor for any visual changes, headaches or dizziness.  Monitor blood pressure regularly.  Follow up with your PCP for further management for high blood pressure. Continue to eat Low sodium and fat diet       PRINCIPAL DISCHARGE DIAGNOSIS  Diagnosis: Unstable angina  Assessment and Plan of Treatment: CT coronary was abnormal therefore a cardiac cath was performed and was diagnostic (no stents needed).   Cardiac catheterization or coronary angiogram is done to understand how the heart is working and to look at the coronary arteries which supply oxygen to the heart muscles, to look at the heart chambers and the valves in the heart.  The doctor uses your groin or your arm to do the procedure  Your doctor will let you know when you can drive and do your usual physical activities  You will need to see your doctor within one week after discharge  Call your doctor if the insertion site bleeds a lot, if you get a fever or have pain, swelling, or redness where the tube went in, or if your leg feels weak, numb, or cold  Continue your medications. No plavix required as there were no stents placed.   Continue aspirin  No heavy lifting, strenuous activity, bending, straining or unnecessary stair climbing for 2 weeks. No sex for 1 week.  No driving for 2 days. You may shower 24 hours following procedure but avoid baths and swimming for 1 week. Check groin site for bleeding and/or swelling daily following procedure. Call your doctor/cardiologist immediately for bleeding or swelling or if you have increased/persistent pain, fever/chills, or drainage at the site. Follow up with your cardiologist in 1- 2 weeks. You may call [facility] at [phone] or [phone] after office hours and weekends with any questions or concerns following your procedure.        SECONDARY DISCHARGE DIAGNOSES  Diagnosis: HTN (hypertension)  Assessment and Plan of Treatment: Continue blood pressure medication regimen as directed. Monitor for any visual changes, headaches or dizziness.  Monitor blood pressure regularly.  Follow up with your PCP for further management for high blood pressure. Continue to eat Low sodium and fat diet      Diagnosis: Hypokalemia  Assessment and Plan of Treatment: Your potassium was low upon admission likely from your hydrochlorathiazide medication.   You were prescribed potassium daily to maintain your potassium levels. Please continue this medication while you are on your BP meds.   Follow up with your cardiologist.

## 2023-03-08 NOTE — DISCHARGE NOTE PROVIDER - NSDCFUADDAPPT_GEN_ALL_CORE_FT
APPTS ARE READY TO BE MADE: [X] YES    Best Family or Patient Contact (if needed):    Additional Information about above appointments (if needed):    1: PCP DR Hayes Waldrop ( [phone]) within 1-2 weeks   2:   3:     Other comments or requests:    APPTS ARE READY TO BE MADE: [X] YES    Best Family or Patient Contact (if needed):    Additional Information about above appointments (if needed):    1: PCP DR Hayes Waldrop ( [phone]) within 1-2 weeks   2: Cardiology with Dr. Isaac Cordero within 1-2 weeks [phone]  3:     Other comments or requests:

## 2023-03-09 ENCOUNTER — TRANSCRIPTION ENCOUNTER (OUTPATIENT)
Age: 83
End: 2023-03-09

## 2023-03-09 VITALS
DIASTOLIC BLOOD PRESSURE: 72 MMHG | TEMPERATURE: 98 F | SYSTOLIC BLOOD PRESSURE: 137 MMHG | HEART RATE: 76 BPM | OXYGEN SATURATION: 95 % | RESPIRATION RATE: 18 BRPM

## 2023-03-09 PROCEDURE — 87635 SARS-COV-2 COVID-19 AMP PRB: CPT

## 2023-03-09 PROCEDURE — 80048 BASIC METABOLIC PNL TOTAL CA: CPT

## 2023-03-09 PROCEDURE — 87641 MR-STAPH DNA AMP PROBE: CPT

## 2023-03-09 PROCEDURE — 80053 COMPREHEN METABOLIC PANEL: CPT

## 2023-03-09 PROCEDURE — 93454 CORONARY ARTERY ANGIO S&I: CPT

## 2023-03-09 PROCEDURE — 93356 MYOCRD STRAIN IMG SPCKL TRCK: CPT

## 2023-03-09 PROCEDURE — C1887: CPT

## 2023-03-09 PROCEDURE — C1894: CPT

## 2023-03-09 PROCEDURE — 93306 TTE W/DOPPLER COMPLETE: CPT

## 2023-03-09 PROCEDURE — 99232 SBSQ HOSP IP/OBS MODERATE 35: CPT

## 2023-03-09 PROCEDURE — 87640 STAPH A DNA AMP PROBE: CPT

## 2023-03-09 PROCEDURE — 71046 X-RAY EXAM CHEST 2 VIEWS: CPT

## 2023-03-09 PROCEDURE — 36415 COLL VENOUS BLD VENIPUNCTURE: CPT

## 2023-03-09 PROCEDURE — 84100 ASSAY OF PHOSPHORUS: CPT

## 2023-03-09 PROCEDURE — C1769: CPT

## 2023-03-09 PROCEDURE — 83735 ASSAY OF MAGNESIUM: CPT

## 2023-03-09 PROCEDURE — 84443 ASSAY THYROID STIM HORMONE: CPT

## 2023-03-09 PROCEDURE — 75574 CT ANGIO HRT W/3D IMAGE: CPT

## 2023-03-09 PROCEDURE — 85025 COMPLETE CBC W/AUTO DIFF WBC: CPT

## 2023-03-09 PROCEDURE — 99285 EMERGENCY DEPT VISIT HI MDM: CPT

## 2023-03-09 PROCEDURE — 93005 ELECTROCARDIOGRAM TRACING: CPT

## 2023-03-09 PROCEDURE — 84484 ASSAY OF TROPONIN QUANT: CPT

## 2023-03-09 RX ORDER — POTASSIUM CHLORIDE 20 MEQ
1 PACKET (EA) ORAL
Qty: 30 | Refills: 0
Start: 2023-03-09 | End: 2023-04-07

## 2023-03-09 RX ADMIN — CHLORHEXIDINE GLUCONATE 1 APPLICATION(S): 213 SOLUTION TOPICAL at 06:34

## 2023-03-09 RX ADMIN — PANTOPRAZOLE SODIUM 40 MILLIGRAM(S): 20 TABLET, DELAYED RELEASE ORAL at 06:34

## 2023-03-09 RX ADMIN — Medication 81 MILLIGRAM(S): at 11:41

## 2023-03-09 RX ADMIN — LOSARTAN POTASSIUM 100 MILLIGRAM(S): 100 TABLET, FILM COATED ORAL at 06:34

## 2023-03-09 NOTE — DISCHARGE NOTE NURSING/CASE MANAGEMENT/SOCIAL WORK - PATIENT PORTAL LINK FT
You can access the FollowMyHealth Patient Portal offered by [facility] by registering at the following website: [email or web address]. By joining [facility]’s FollowMyHealth portal, you will also be able to view your health information using other applications (apps) compatible with our system.

## 2023-03-09 NOTE — DISCHARGE NOTE NURSING/CASE MANAGEMENT/SOCIAL WORK - NSDCPEFALRISK_GEN_ALL_CORE
For information on Fall & Injury Prevention, visit: [email or web address] OR  [email or web address] OR  https://www.cdc.gov/steadi/patient.html

## 2023-03-09 NOTE — PROGRESS NOTE ADULT - ASSESSMENT
82 year old woman with hypertension, UE DVT with chest pain, now resolved.    - no sxs acute ischemia  - remains sr  - no vol ol  - echo with normal LV function  - Coronary CTA with Densely calcified plaque at the RCA ostium, and is now s/p cath with 50% ostial RCA and 40% om1   - cont statin  - start asa 82  - Bp better, now that she is back on losartan/hctz  - during her symptoms, she is noted to have PVCs, which may be responsible for her symptoms  - she has been intermittently hypokalemic, while on hctz, and suggest potassium supplementation at home  - dc planning per primary team. She will follow up with Dr. Cordero. 
82F c hx htn, ue dvt, pw unstable angina
82 year old woman with hypertension, UE DVT with atypical chest pain.    -no sxs acute ischemia  -remains sr  -no vol ol  -await echo and coronary cta  -cont statin  -will follow 
82F c hx htn, ue dvt, pw unstable angina
82 year old woman with hypertension, UE DVT with chest pain, now resolved.    - no sxs acute ischemia  - remains sr  - no vol ol  - echo with normal LV function  - Coronary CTA with Densely calcified plaque at the RCA ostium and a severe stenosis cannot be excluded. Given this and her symptoms, will plan for cardiac cath today.   - cont statin  - start asa 82  - will follow 
82F c hx htn, ue dvt, pw unstable angina

## 2023-03-09 NOTE — CHART NOTE - NSCHARTNOTEFT_GEN_A_CORE
Patient medically cleared per Dr. Stephens.   D/C paperwork completed and D/C meds d/w Dr. Stephens/cardiology.   Patient is hemodynamically stable for discharge, labs reviewed.
Metoprolol 5mg IVP ordered per discussion with Dr. Rosa pre CT Coronaries to regulate the HR better  CT dept wanted to change IV to PO metoprolol - Changed to Metoprolol 50mg PO x 1 prior to CT Coronaries (d/w Dr. Stephens).    [id number]

## 2023-03-09 NOTE — DISCHARGE NOTE NURSING/CASE MANAGEMENT/SOCIAL WORK - NSDCFUADDAPPT_GEN_ALL_CORE_FT
APPTS ARE READY TO BE MADE: [X] YES    Best Family or Patient Contact (if needed):    Additional Information about above appointments (if needed):    1: PCP DR Hayes Waldrop ( [phone]) within 1-2 weeks   2: Cardiology with Dr. Isaac Cordero within 1-2 weeks [phone]  3:     Other comments or requests:    (4) completely dependent

## 2023-03-09 NOTE — PROGRESS NOTE ADULT - SUBJECTIVE AND OBJECTIVE BOX
[facility] - Moe Swain Pannella, Patel, Savella  Office Number:  [phone]    Patient resting comfortably in bed in NAD.  Laying flat with no respiratory distress.  No complaints of dyspnea, palpitations, PND, or orthopnea.  did have some chest tightness overnight, that associated with brief dyspnea, and resolved with a cough    ROS: negative unless otherwise mentioned.    Telemetry:  sr    MEDICATIONS  (STANDING):  aspirin enteric coated 81 milliGRAM(s) Oral daily  atorvastatin 80 milliGRAM(s) Oral at bedtime  chlorhexidine 2% Cloths 1 Application(s) Topical <User Schedule>  hydrochlorothiazide 12.5 milliGRAM(s) Oral daily  losartan 100 milliGRAM(s) Oral daily  morphine  - Injectable 2 milliGRAM(s) IV Push once  pantoprazole    Tablet 40 milliGRAM(s) Oral before breakfast  sodium chloride 0.9%. 250 milliLiter(s) (75 mL/Hr) IV Continuous <Continuous>    MEDICATIONS  (PRN):      Allergies    clindamycin (Other)  penicillin (Rash)    Intolerances    sulfa drugs (Other)      Vital Signs Last 24 Hrs  T(C): 36.7 (09 Mar 2023 07:36), Max: 36.8 (08 Mar 2023 21:25)  T(F): 98.1 (09 Mar 2023 07:36), Max: 98.2 (08 Mar 2023 21:25)  HR: 70 (09 Mar 2023 07:36) (63 - 80)  BP: 146/80 (09 Mar 2023 07:36) (132/98 - 184/79)  BP(mean): --  RR: 18 (09 Mar 2023 07:36) (18 - 18)  SpO2: 98% (09 Mar 2023 07:36) (96% - 99%)    Parameters below as of 09 Mar 2023 07:36  Patient On (Oxygen Delivery Method): room air        I&O's Summary    08 Mar 2023 07:01  -  09 Mar 2023 07:00  --------------------------------------------------------  IN: 600 mL / OUT: 0 mL / NET: 600 mL        ON EXAM:    General: NAD, awake and alert, oriented x 3  HEENT: Mucous membranes are moist, anicteric  Lungs: Non-labored, breath sounds are clear bilaterally, No wheezing, rales or rhonchi  Cardiovascular: Regular, S1 and S2, no murmurs, rubs, or gallops  Gastrointestinal: Bowel Sounds present, soft, nontender.   Lymph: No peripheral edema. No lymphadenopathy.  Skin: No rashes or ulcers  Psych:  Mood & affect appropriate      LABS: All Labs Reviewed:    08 Mar 2023 05:36    144    |  109    |  12     ----------------------------<  105    3.7     |  24     |  0.64   07 Mar 2023 07:18    143    |  107    |  12     ----------------------------<  109    3.1     |  26     |  0.57   06 Mar 2023 19:34    143    |  105    |  13     ----------------------------<  130    3.7     |  25     |  0.64     Ca    9.3        08 Mar 2023 05:36  Ca    9.4        07 Mar 2023 07:18  Ca    10.3       06 Mar 2023 19:34            Blood Culture:       
[facility]    Moe Swain, Herlinda, Rachel Monroe      [phone]    CHIEF COMPLAINT: Patient is a 82y old  Female who presents with a chief complaint of chest pain (06 Mar 2023 12:41)      Follow Up: cp    Interim history: The patient reports no new symptoms.  Denies chest discomfort and shortness of breath.  No abdominal pain.  No new neurologic symptoms.      MEDICATIONS  (STANDING):  atorvastatin 80 milliGRAM(s) Oral at bedtime  morphine  - Injectable 2 milliGRAM(s) IV Push once  pantoprazole    Tablet 40 milliGRAM(s) Oral before breakfast    MEDICATIONS  (PRN):      REVIEW OF SYSTEMS:  eye, ent, GI, , allergic, dermatologic, musculoskeletal and neurologic are negative except as described above    Vital Signs Last 24 Hrs  T(C): 36.7 (07 Mar 2023 04:23), Max: 36.9 (06 Mar 2023 21:39)  T(F): 98.1 (07 Mar 2023 04:23), Max: 98.4 (06 Mar 2023 21:39)  HR: 73 (07 Mar 2023 04:23) (69 - 84)  BP: 146/83 (07 Mar 2023 04:23) (132/78 - 149/82)  BP(mean): --  RR: 18 (07 Mar 2023 04:23) (18 - 18)  SpO2: 96% (07 Mar 2023 04:23) (95% - 97%)    Parameters below as of 07 Mar 2023 04:23  Patient On (Oxygen Delivery Method): room air        I&O's Summary    06 Mar 2023 07:01  -  07 Mar 2023 07:00  --------------------------------------------------------  IN: 150 mL / OUT: 0 mL / NET: 150 mL        Telemetry past 24h: sr    PHYSICAL EXAM:    Constitutional: well-nourished, well-developed, NAD   HEENT:  MMM, sclerae anicteric, conjunctivae clear, no oral cyanosis.  Pulmonary: Non-labored, breath sounds are clear bilaterally, No wheezing, rales or rhonchi  Cardiovascular: Regular, S1 and S2.  No murmur.  No rubs, gallops or clicks  Gastrointestinal: Bowel Sounds present, soft, nontender.   Lymph: No peripheral edema.   Neurological: Alert, no focal deficits  Skin: No rashes.  Psych:  Mood & affect appropriate    LABS: All Labs Reviewed:                        12.2   6.58  )-----------( 173      ( 06 Mar 2023 02:45 )             36.8                         13.1   8.00  )-----------( 192      ( 05 Mar 2023 17:35 )             40.6     07 Mar 2023 07:18    143    |  107    |  12     ----------------------------<  109    3.1     |  26     |  0.57   06 Mar 2023 19:34    143    |  105    |  13     ----------------------------<  130    3.7     |  25     |  0.64   06 Mar 2023 02:45    142    |  104    |  17     ----------------------------<  110    2.9     |  27     |  0.84     Ca    9.4        07 Mar 2023 07:18  Ca    10.3       06 Mar 2023 19:34  Ca    9.6        06 Mar 2023 02:45  Phos  3.7       06 Mar 2023 02:45  Phos  3.6       05 Mar 2023 17:35  Mg     1.6       06 Mar 2023 02:45  Mg     1.6       05 Mar 2023 17:35    TPro  5.9    /  Alb  4.0    /  TBili  0.2    /  DBili  x      /  AST  15     /  ALT  11     /  AlkPhos  60     06 Mar 2023 02:45  TPro  6.8    /  Alb  4.5    /  TBili  0.4    /  DBili  x      /  AST  16     /  ALT  12     /  AlkPhos  69     05 Mar 2023 17:35          Blood Culture:     03-06 @ 02:45  TSH: 3.95      RADIOLOGY:    EKG:    Echo:    
    SUBJECTIVE / OVERNIGHT EVENTS:pt seen and examined  03-06-23     MEDICATIONS  (STANDING):  atorvastatin 80 milliGRAM(s) Oral at bedtime  pantoprazole    Tablet 40 milliGRAM(s) Oral before breakfast    MEDICATIONS  (PRN):    T(C): 36.9 (03-06-23 @ 21:39), Max: 36.9 (03-05-23 @ 23:30)  HR: 71 (03-06-23 @ 21:39) (71 - 86)  BP: 132/78 (03-06-23 @ 21:39) (131/67 - 162/88)  RR: 18 (03-06-23 @ 21:39) (17 - 18)  SpO2: 95% (03-06-23 @ 21:39) (95% - 99%)    CAPILLARY BLOOD GLUCOSE        I&O's Summary      Constitutional: No fever, fatigue  Skin: No rash.  Eyes: No recent vision problems or eye pain.  ENT: No congestion, ear pain, or sore throat.  Cardiovascular: No chest pain or palpation.  Respiratory: No cough, shortness of breath, congestion, or wheezing.  Gastrointestinal: No abdominal pain, nausea, vomiting, or diarrhea.  Genitourinary: No dysuria.  Musculoskeletal: No joint swelling.  Neurologic: No headache.    PHYSICAL EXAM:  GENERAL: NAD  EYES: EOMI, PERRLA  NECK: Supple, No JVD  CHEST/LUNG: dec breath sounds at bases  HEART:  S1 , S2 +  ABDOMEN: soft , bs  EXTREMITIES:  trace edeMA  NEUROLOGY:Alert awake oriented       LABS:                        12.2   6.58  )-----------( 173      ( 06 Mar 2023 02:45 )             36.8     03-06    143  |  105  |  13  ----------------------------<  130<H>  3.7   |  25  |  0.64    Ca    10.3      06 Mar 2023 19:34  Phos  3.7     03-06  Mg     1.6     03-06    TPro  5.9<L>  /  Alb  4.0  /  TBili  0.2  /  DBili  x   /  AST  15  /  ALT  11  /  AlkPhos  60  03-06              RADIOLOGY & ADDITIONAL TESTS:    Imaging Personally Reviewed:    Consultant(s) Notes Reviewed:      Care Discussed with Consultants/Other Providers:  
    SUBJECTIVE / OVERNIGHT EVENTS:pt seen and examined  03-07-23     MEDICATIONS  (STANDING):  atorvastatin 80 milliGRAM(s) Oral at bedtime  chlorhexidine 2% Cloths 1 Application(s) Topical <User Schedule>  morphine  - Injectable 2 milliGRAM(s) IV Push once  pantoprazole    Tablet 40 milliGRAM(s) Oral before breakfast    MEDICATIONS  (PRN):    Vital Signs Last 24 Hrs  T(C): 36.5 (03-07-23 @ 21:09), Max: 36.7 (03-07-23 @ 04:23)  T(F): 97.7 (03-07-23 @ 21:09), Max: 98.1 (03-07-23 @ 04:23)  HR: 59 (03-07-23 @ 21:09) (59 - 76)  BP: 139/85 (03-07-23 @ 21:09) (139/85 - 148/78)  BP(mean): --  RR: 18 (03-07-23 @ 21:09) (18 - 18)  SpO2: 96% (03-07-23 @ 21:09) (96% - 98%)      Constitutional: No fever, fatigue  Skin: No rash.  Eyes: No recent vision problems or eye pain.  ENT: No congestion, ear pain, or sore throat.  Cardiovascular: No chest pain or palpation.  Respiratory: No cough, shortness of breath, congestion, or wheezing.  Gastrointestinal: No abdominal pain, nausea, vomiting, or diarrhea.  Genitourinary: No dysuria.  Musculoskeletal: No joint swelling.  Neurologic: No headache.    PHYSICAL EXAM:  GENERAL: NAD  EYES: EOMI, PERRLA  NECK: Supple, No JVD  CHEST/LUNG: dec breath sounds at bases  HEART:  S1 , S2 +  ABDOMEN: soft , bs  EXTREMITIES:  trace edeMA  NEUROLOGY:Alert awake oriented       LABS:  03-07    143  |  107  |  12  ----------------------------<  109<H>  3.1<L>   |  26  |  0.57    Ca    9.4      07 Mar 2023 07:18  Phos  3.7     03-06  Mg     1.6     03-06    TPro  5.9<L>  /  Alb  4.0  /  TBili  0.2  /  DBili      /  AST  15  /  ALT  11  /  AlkPhos  60  03-06    Creatinine Trend: 0.57 <--, 0.64 <--, 0.84 <--, 0.71 <--                        12.2   6.58  )-----------( 173      ( 06 Mar 2023 02:45 )             36.8     Urine Studies:            LIVER FUNCTIONS - ( 06 Mar 2023 02:45 )  Alb: 4.0 g/dL / Pro: 5.9 g/dL / ALK PHOS: 60 U/L / ALT: 11 U/L / AST: 15 U/L / GGT: x                     RADIOLOGY & ADDITIONAL TESTS:    Imaging Personally Reviewed:    Consultant(s) Notes Reviewed:      Care Discussed with Consultants/Other Providers:  
[facility] - Moe Swain, Fer Pate Savella  Office Number:  [phone]    Patient resting comfortably in bed in NAD.  Laying flat with no respiratory distress.  No complaints of chest pain, dyspnea, palpitations, PND, or orthopnea.    ROS: negative unless otherwise mentioned.    Telemetry:  sr    MEDICATIONS  (STANDING):  atorvastatin 80 milliGRAM(s) Oral at bedtime  chlorhexidine 2% Cloths 1 Application(s) Topical <User Schedule>  morphine  - Injectable 2 milliGRAM(s) IV Push once  pantoprazole    Tablet 40 milliGRAM(s) Oral before breakfast    MEDICATIONS  (PRN):      Allergies    clindamycin (Other)  penicillin (Rash)    Intolerances    sulfa drugs (Other)      Vital Signs Last 24 Hrs  T(C): 36.4 (08 Mar 2023 04:35), Max: 36.5 (07 Mar 2023 12:18)  T(F): 97.5 (08 Mar 2023 04:35), Max: 97.7 (07 Mar 2023 12:18)  HR: 71 (08 Mar 2023 04:35) (59 - 76)  BP: 161/81 (08 Mar 2023 04:35) (139/85 - 161/81)  BP(mean): --  RR: 18 (08 Mar 2023 04:35) (18 - 18)  SpO2: 98% (08 Mar 2023 04:35) (96% - 98%)    Parameters below as of 08 Mar 2023 04:35  Patient On (Oxygen Delivery Method): room air        I&O's Summary    07 Mar 2023 07:01  -  08 Mar 2023 07:00  --------------------------------------------------------  IN: 250 mL / OUT: 0 mL / NET: 250 mL        ON EXAM:    General: NAD, awake and alert, oriented x 3  HEENT: Mucous membranes are moist, anicteric  Lungs: Non-labored, breath sounds are clear bilaterally, No wheezing, rales or rhonchi  Cardiovascular: Regular, S1 and S2, no murmurs, rubs, or gallops  Gastrointestinal: Bowel Sounds present, soft, nontender.   Lymph: No peripheral edema. No lymphadenopathy.  Skin: No rashes or ulcers  Psych:  Mood & affect appropriate    LABS: All Labs Reviewed:                        12.2   6.58  )-----------( 173      ( 06 Mar 2023 02:45 )             36.8                         13.1   8.00  )-----------( 192      ( 05 Mar 2023 17:35 )             40.6     08 Mar 2023 05:36    144    |  109    |  12     ----------------------------<  105    3.7     |  24     |  0.64   07 Mar 2023 07:18    143    |  107    |  12     ----------------------------<  109    3.1     |  26     |  0.57   06 Mar 2023 19:34    143    |  105    |  13     ----------------------------<  130    3.7     |  25     |  0.64     Ca    9.3        08 Mar 2023 05:36  Ca    9.4        07 Mar 2023 07:18  Ca    10.3       06 Mar 2023 19:34  Phos  3.7       06 Mar 2023 02:45  Phos  3.6       05 Mar 2023 17:35  Mg     1.6       06 Mar 2023 02:45  Mg     1.6       05 Mar 2023 17:35    TPro  5.9    /  Alb  4.0    /  TBili  0.2    /  DBili  x      /  AST  15     /  ALT  11     /  AlkPhos  60     06 Mar 2023 02:45  TPro  6.8    /  Alb  4.5    /  TBili  0.4    /  DBili  x      /  AST  16     /  ALT  12     /  AlkPhos  69     05 Mar 2023 17:35          Blood Culture:     03-06 @ 02:45  TSH: 3.95    
    SUBJECTIVE / OVERNIGHT EVENTS:pt seen and examined  03-08-23     MEDICATIONS  (STANDING):  aspirin enteric coated 81 milliGRAM(s) Oral daily  atorvastatin 80 milliGRAM(s) Oral at bedtime  chlorhexidine 2% Cloths 1 Application(s) Topical <User Schedule>  hydrochlorothiazide 12.5 milliGRAM(s) Oral daily  losartan 100 milliGRAM(s) Oral daily  morphine  - Injectable 2 milliGRAM(s) IV Push once  pantoprazole    Tablet 40 milliGRAM(s) Oral before breakfast  sodium chloride 0.9%. 250 milliLiter(s) (75 mL/Hr) IV Continuous <Continuous>    MEDICATIONS  (PRN):    Vital Signs Last 24 Hrs  T(C): 36.8 (03-08-23 @ 21:25), Max: 36.8 (03-08-23 @ 21:25)  T(F): 98.2 (03-08-23 @ 21:25), Max: 98.2 (03-08-23 @ 21:25)  HR: 68 (03-08-23 @ 21:25) (68 - 80)  BP: 151/72 (03-08-23 @ 21:25) (132/98 - 184/79)  BP(mean): --  RR: 18 (03-08-23 @ 21:25) (18 - 18)  SpO2: 97% (03-08-23 @ 21:25) (96% - 99%)        Constitutional: No fever, fatigue  Skin: No rash.  Eyes: No recent vision problems or eye pain.  ENT: No congestion, ear pain, or sore throat.  Cardiovascular: No chest pain or palpation.  Respiratory: No cough, shortness of breath, congestion, or wheezing.  Gastrointestinal: No abdominal pain, nausea, vomiting, or diarrhea.  Genitourinary: No dysuria.  Musculoskeletal: No joint swelling.  Neurologic: No headache.    PHYSICAL EXAM:  GENERAL: NAD  EYES: EOMI, PERRLA  NECK: Supple, No JVD  CHEST/LUNG: dec breath sounds at bases  HEART:  S1 , S2 +  ABDOMEN: soft , bs  EXTREMITIES:  trace edeMA  NEUROLOGY:Alert awake oriented     LABS:  03-08    144  |  109<H>  |  12  ----------------------------<  105<H>  3.7   |  24  |  0.64    Ca    9.3      08 Mar 2023 05:36      Creatinine Trend: 0.64 <--, 0.57 <--, 0.64 <--, 0.84 <--, 0.71 <--    Urine Studies:                            RADIOLOGY & ADDITIONAL TESTS:    Imaging Personally Reviewed:yes    Consultant(s) Notes Reviewed:  yes    Care Discussed with Consultants/Other Providers:yes

## 2023-03-27 ENCOUNTER — APPOINTMENT (OUTPATIENT)
Dept: PULMONOLOGY | Facility: CLINIC | Age: 83
End: 2023-03-27
Payer: MEDICARE

## 2023-03-27 VITALS — HEART RATE: 89 BPM | OXYGEN SATURATION: 94 % | SYSTOLIC BLOOD PRESSURE: 153 MMHG | DIASTOLIC BLOOD PRESSURE: 82 MMHG

## 2023-03-27 DIAGNOSIS — J90 PLEURAL EFFUSION, NOT ELSEWHERE CLASSIFIED: ICD-10-CM

## 2023-03-27 PROCEDURE — 94729 DIFFUSING CAPACITY: CPT

## 2023-03-27 PROCEDURE — 94060 EVALUATION OF WHEEZING: CPT

## 2023-03-27 PROCEDURE — 95012 NITRIC OXIDE EXP GAS DETER: CPT

## 2023-03-27 PROCEDURE — 99214 OFFICE O/P EST MOD 30 MIN: CPT | Mod: 25

## 2023-03-27 PROCEDURE — 94727 GAS DIL/WSHOT DETER LNG VOL: CPT

## 2023-03-27 PROCEDURE — 71046 X-RAY EXAM CHEST 2 VIEWS: CPT

## 2023-03-27 PROCEDURE — ZZZZZ: CPT

## 2023-03-27 RX ORDER — MECLIZINE HYDROCHLORIDE 12.5 MG/1
12.5 TABLET ORAL DAILY
Qty: 30 | Refills: 1 | Status: DISCONTINUED | COMMUNITY
Start: 2021-06-14 | End: 2023-03-27

## 2023-03-27 RX ORDER — FUROSEMIDE 20 MG/1
20 TABLET ORAL
Qty: 30 | Refills: 1 | Status: DISCONTINUED | COMMUNITY
Start: 2022-11-16 | End: 2023-03-27

## 2023-03-27 RX ORDER — DOXYCYCLINE 100 MG/1
100 CAPSULE ORAL TWICE DAILY
Qty: 14 | Refills: 0 | Status: DISCONTINUED | COMMUNITY
Start: 2022-12-20 | End: 2023-03-27

## 2023-03-27 RX ORDER — IPRATROPIUM BROMIDE 42 UG/1
0.06 SPRAY NASAL
Qty: 1 | Refills: 3 | Status: ACTIVE | COMMUNITY
Start: 2023-03-27 | End: 1900-01-01

## 2023-03-27 NOTE — DISCUSSION/SUMMARY
[FreeTextEntry1] : Abnl PFT with Diffusion abnormality\par Overall stable pul physiology without decline\par Postnasal drip\par Stable pulmonary physiology as noted.  Stable chest x-ray imaging and no evidence of a developing pleural effusion\par \par PNDS - GERD\par added flonase inc dose prilosec with nasal atelin\par \par  history of paroxysmal atrial fibrillation\par Cardiology noted Dr Cordero\par  ECHO\par  event monitor\par  Carotid duplex \par \par PET CT per Oncology July 2019 with APPT Eden Bassett July 7 2022\par Trial singulair 10 mg\par  Flonase  2 sprays [place] nostril QD\par \par Stable KAN-  with  component deconditioning\par Hodgkins x 5 yr  remission\par ECHO 5/7/21 \par LVH diastolic dysfx\par Flu  shot up to  date/ pneumonia up  to  date\par Walking exercise advised and follow-up 3 months\par \par PFIZER COVID vaccine  completed  2 dose protocol + booster and + BIVAlent\par Flu vaccine up to  date\par \par  mammogram states up to date per patient\par Timing bone density APril 2023 per ENDO\par

## 2023-03-27 NOTE — HISTORY OF PRESENT ILLNESS
[Stable] : are stable [Difficulty Breathing During Exertion] : stable dyspnea on exertion [Feelings Of Weakness On Exertion] : stable exercise intolerance [Cough] : denies coughing [Wheezing] : denies wheezing [Regional Soft Tissue Swelling Both Lower Extremities] : denies lower extremity edema [Chest Pain Or Discomfort] : denies chest pain [Fever] : denies fever [Obstructive Sleep Apnea] : obstructive sleep apnea [Date: ___] : Date of most recent diagnostic polysomnogram: [unfilled] [AHI: ___ per hour] : Apnea-hypopnea index:  [unfilled] per hour [Wt Gain ___ Lbs] : no recent weight gain [Oxygen] : the patient uses no supplemental oxygen [FreeTextEntry1] : Admission [facility]\par Chest pain\par History remote Hodgkin's lymphoma\par Active\par Reported clinical exam unremarkable\par CBC normal\par Serum electrolytes noted a low potassium 2.9 with creatinine 0.84\par History of hypertension upper extremity DVT atypical chest pain\par Patient was admitted to telemetry\par Cardiology evaluation rule out acute coronary syndrome\par Cardiac Cath  ASHD no stents PTCA No a FIB\par PAT ?\par addedbaby  ASA and K+ supplement\par at present not on lasix\par Imaging while hospitalized suggested on the CT chest there is possible small left pleural effusion\par \par pos massive drip  and sore throat with cough\par \par Dec 29 2022 NS - leg collapse ? lumbar disease txed with epidural\par pending L hip injection\par \par no active resp complaints \par awakens little had stiffness\par \par Cardiology reviewed and noted\par Patient states up-to-date with hematology oncology for which was MD\par \par KAN stable no decline\par Oncology\par appt set  non active remission Hodgkins

## 2023-03-27 NOTE — PROCEDURE
[FreeTextEntry1] : NIOX 17 normal range March 27, 2023\par \par PFT March 27, 2023\par Normal flow rates\par 21% response to bronchodilator at the small airways\par Normal\par TLC 95% predicted\par Diffusion normal range 76% predicted\par Hemoglobin 12.2\par No decline of multiple pulmonary physiology\par \par Chest x-ray PA lateral March 27, 2023\par Cardiac size normal\par Lung fields are clear\par No parenchymal infiltrates pleural effusions dominant pulmonary nodules\par Lamination right hemidiaphragm\par Impression clear lungs\par No evidence for residual pneumonia no evidence of a left pleural effusion\par \par \par PFT 11/16/2022\par Flow rates nl\par  Lung volumes nl\par  DLCO  64 % with mild  loss fx  alveolar  capillary units HGB 14.5\par \par Ck 7/6/22\par FVC 73 %\par stable pul flows\par \par PFT 3/31/22\par  Mild reduction FVC\par TLC 79 % \par DLCO 69 % minimal reduction\par HGB 12.6\par \par Pulmonary 6 minute exercise study\par 3/31 /22\par RA study  Negative \par \par Chest x-ray PA lateral March 31, 2022\par Normal cardiac size\par Clear lung fields\par No parenchymal infiltrates pleural effusions dominant pulmonary nodules\par Soft tissue bony structures unremarkable\par Mild kyphosis with thinning vertebral spine\par No gross interval change compared to chest x-ray of June 14, 2021\par \par PFT 9/20/21\par Minimal OAD\par Lung Volumes nl \par TLC 92 %\par  low nl DLCO 73 % \par HGB 14.6\par \par Chest x-ray PA lateral June 14, 2021\par Cardiac size normal\par Lung fields are clear\par Lucina mediastinum unremarkable\par Soft tissue bony structures unremarkable\par No parenchymal infiltrates pleural effusions with dominant pulmonary nodules\par Impression clear lungs\par \par EKG 6/14/21\par NSR\par \par PFT 3/15/21\par normal flow rates\par Lung Volumes  normal\par  DLCO  69 % mild  reduction\par HGB 14.0\par \par X-ray PA lateral March 15, 2021\par Cardiac size normal\par No parenchymal infiltrate pulmonary opacities pulmonary consolidation dominant pulmonary nodules pleural effusion pneumothorax\par Soft tissue bony structures unremarkable\par Lucina mediastinum grossly unremarkable\par Impression clear lungs\par Comparison to February 7, 2020 without interval change\par \par PFT Nov 9 2020\par normal flow  rates minimal OAD \par normal lung volumes\par  Very mild / low nl DLCO  73 % pred\par HGB 13.5\par \par PET scan June 2020- for evidence of recurrent disease\par \par PFT August 3, 2020\par Flow rates normal with normal spirometry\par Total lung capacity normal 80% of predicted.\par Diffusion 79% predicted\par Hemoglobin 13.8.\par \par Pulmonary 6-minute walk step test August 3, 2020\par Total steps 120\par Room air Baseline room air O2 saturation 98%\par With increased maximum heart rate to 1 10–1 12 Aidan desaturation 94 to 95%\par Impression normal study\par Negative exertional hypoxemia\par \par X-ray PA lateral February 7, 2020\par Normal cardiac size\par No parenchymal infiltrate pleural effusions dominant pulmonary nodules\par Impression clear lungs\par \par Spirometry  11/19/2019\par  Normal  flow  rates\par  No BD  at  FEV!\par \par PFT 6/19/2019\par Normal Flow s Rates\par  12 Percent response to bronchodilator at FEV1\par Lung volumes normal\par  DLCO 80 %\par HGB14.3\par \par PET CT  No Evidence active disease July 2019\par  \par ECHO 1/2019\par  diastolic dysfx\par No reported PAP\par \par Urine  noted\par Spirometry 8/22/2019\par  without bronchodilator normal flow rates stable\par \par DTaP administration\par Completed to dose shingles Shingrix protocol\par \par Blood Draw\par Data review August 23, 2019\par CBC normal range\par TFTs normal\par Hemoglobin A1c 5.6% with mean plasma glucose 114\par Lipid profile\par Total cholesterol 135 HDL 35 LDL 69\par Triglycerides nonfasting 156\par Vitamin D 36.8\par Serum sodium 146\par Serum potassium 3.6\par BUN 16 creatinine 0.73\par Serum calcium 9.7\par Liver function testing normal\par \par HD FLU IM 9/20/21

## 2023-03-28 ENCOUNTER — NON-APPOINTMENT (OUTPATIENT)
Age: 83
End: 2023-03-28

## 2023-03-28 LAB
ANION GAP SERPL CALC-SCNC: 14 MMOL/L
BUN SERPL-MCNC: 20 MG/DL
CALCIUM SERPL-MCNC: 10.5 MG/DL
CHLORIDE SERPL-SCNC: 104 MMOL/L
CO2 SERPL-SCNC: 27 MMOL/L
CREAT SERPL-MCNC: 0.72 MG/DL
EGFR: 84 ML/MIN/1.73M2
GLUCOSE SERPL-MCNC: 90 MG/DL
POTASSIUM SERPL-SCNC: 4.5 MMOL/L
SODIUM SERPL-SCNC: 145 MMOL/L

## 2023-03-28 RX ORDER — POTASSIUM CHLORIDE 750 MG/1
10 TABLET, EXTENDED RELEASE ORAL
Qty: 30 | Refills: 0 | Status: DISCONTINUED | COMMUNITY
Start: 2020-02-13 | End: 2023-03-28

## 2023-03-28 NOTE — ED ADULT NURSE NOTE - CHIEF COMPLAINT
Results reviewed by Dr. Vallejo. Per verbal order by Dr. Vallejo, uptake and scan reviewed; Graves disease. Pt having SOTO today.   The patient is a 82y Female complaining of pain, lower leg.

## 2023-04-30 ENCOUNTER — RX RENEWAL (OUTPATIENT)
Age: 83
End: 2023-04-30

## 2023-05-08 ENCOUNTER — APPOINTMENT (OUTPATIENT)
Dept: PULMONOLOGY | Facility: CLINIC | Age: 83
End: 2023-05-08
Payer: MEDICARE

## 2023-05-08 ENCOUNTER — NON-APPOINTMENT (OUTPATIENT)
Age: 83
End: 2023-05-08

## 2023-05-08 VITALS — OXYGEN SATURATION: 99 % | DIASTOLIC BLOOD PRESSURE: 82 MMHG | SYSTOLIC BLOOD PRESSURE: 156 MMHG | HEART RATE: 82 BPM

## 2023-05-08 DIAGNOSIS — R25.2 CRAMP AND SPASM: ICD-10-CM

## 2023-05-08 LAB
ANION GAP SERPL CALC-SCNC: 12 MMOL/L
BUN SERPL-MCNC: 23 MG/DL
CALCIUM SERPL-MCNC: 10 MG/DL
CHLORIDE SERPL-SCNC: 105 MMOL/L
CO2 SERPL-SCNC: 28 MMOL/L
CREAT SERPL-MCNC: 0.78 MG/DL
EGFR: 76 ML/MIN/1.73M2
FOLATE SERPL-MCNC: 10.3 NG/ML
GLUCOSE SERPL-MCNC: 92 MG/DL
MAGNESIUM SERPL-MCNC: 1.7 MG/DL
PHOSPHATE SERPL-MCNC: 3.1 MG/DL
POTASSIUM SERPL-SCNC: 3.8 MMOL/L
SODIUM SERPL-SCNC: 145 MMOL/L
VIT B12 SERPL-MCNC: 408 PG/ML

## 2023-05-08 PROCEDURE — 36415 COLL VENOUS BLD VENIPUNCTURE: CPT

## 2023-05-08 PROCEDURE — 99213 OFFICE O/P EST LOW 20 MIN: CPT | Mod: 25

## 2023-05-08 NOTE — HISTORY OF PRESENT ILLNESS
- BP: ()/(58-75) 113/75  - Continue home norvasc   [Stable] : are stable [Difficulty Breathing During Exertion] : stable dyspnea on exertion [Feelings Of Weakness On Exertion] : stable exercise intolerance [Cough] : denies coughing [Wheezing] : denies wheezing [Regional Soft Tissue Swelling Both Lower Extremities] : denies lower extremity edema [Chest Pain Or Discomfort] : denies chest pain [Fever] : denies fever [Obstructive Sleep Apnea] : obstructive sleep apnea [Date: ___] : Date of most recent diagnostic polysomnogram: [unfilled] [AHI: ___ per hour] : Apnea-hypopnea index:  [unfilled] per hour [Wt Gain ___ Lbs] : no recent weight gain [Oxygen] : the patient uses no supplemental oxygen [FreeTextEntry1] : f/u labs\par Serum electrolytes normal\par Renal function normal\par Creatinine 0.72\par GFR nl 84\par Serum potassium 4.5 normal range\par Post hypokalemia\par Patient was placed on potassium 10 mEq extended release daily\par per pain  management request lower ext cramps\par  B 12  and Mg\par \par Admission [facility] [place]\par Chest pain\par History remote Hodgkin's lymphoma\par Active\par Reported clinical exam unremarkable\par CBC normal\par Serum electrolytes noted a low potassium 2.9 with creatinine 0.84\par History of hypertension upper extremity DVT atypical chest pain\par Patient was admitted to telemetry\par Cardiology evaluation rule out acute coronary syndrome\par Cardiac Cath  ASHD no stents PTCA No a FIB\par PAT ?\par addedbaby  ASA and K+ supplement\par at present not on lasix\par Imaging while hospitalized suggested on the CT chest there is possible small left pleural effusion\par \par pos massive drip  and sore throat with cough\par \par Dec 29 2022 NS - leg collapse ? lumbar disease txed with epidural\par pending L hip injection\par \par no active resp complaints \par awakens little had stiffness\par \par Cardiology reviewed and noted\par Patient states up-to-date with hematology oncology for which was MD\par \par KAN stable no decline\par Oncology\par appt set  non active remission Hodgkins

## 2023-05-08 NOTE — PROCEDURE
[FreeTextEntry1] : NIOX 17 normal range March 27, 2023\par \par PFT March 27, 2023\par Normal flow rates\par 21% response to bronchodilator at the small airways\par Normal\par TLC 95% predicted\par Diffusion normal range 76% predicted\par Hemoglobin 12.2\par No decline of multiple pulmonary physiology\par \par Chest x-ray PA lateral March 27, 2023\par Cardiac size normal\par Lung fields are clear\par No parenchymal infiltrates pleural effusions dominant pulmonary nodules\par Lamination right hemidiaphragm\par Impression clear lungs\par No evidence for residual pneumonia no evidence of a left pleural effusion\par \par \par PFT 11/16/2022\par Flow rates nl\par  Lung volumes nl\par  DLCO  64 % with mild  loss fx  alveolar  capillary units HGB 14.5\par \par Ck 7/6/22\par FVC 73 %\par stable pul flows\par \par PFT 3/31/22\par  Mild reduction FVC\par TLC 79 % \par DLCO 69 % minimal reduction\par HGB 12.6\par \par Pulmonary 6 minute exercise study\par 3/31 /22\par RA study  Negative \par \par Chest x-ray PA lateral March 31, 2022\par Normal cardiac size\par Clear lung fields\par No parenchymal infiltrates pleural effusions dominant pulmonary nodules\par Soft tissue bony structures unremarkable\par Mild kyphosis with thinning vertebral spine\par No gross interval change compared to chest x-ray of June 14, 2021\par \par PFT 9/20/21\par Minimal OAD\par Lung Volumes nl \par TLC 92 %\par  low nl DLCO 73 % \par HGB 14.6\par \par Chest x-ray PA lateral June 14, 2021\par Cardiac size normal\par Lung fields are clear\par Lucina mediastinum unremarkable\par Soft tissue bony structures unremarkable\par No parenchymal infiltrates pleural effusions with dominant pulmonary nodules\par Impression clear lungs\par \par EKG 6/14/21\par NSR\par \par PFT 3/15/21\par normal flow rates\par Lung Volumes  normal\par  DLCO  69 % mild  reduction\par HGB 14.0\par \par X-ray PA lateral March 15, 2021\par Cardiac size normal\par No parenchymal infiltrate pulmonary opacities pulmonary consolidation dominant pulmonary nodules pleural effusion pneumothorax\par Soft tissue bony structures unremarkable\par Lucina mediastinum grossly unremarkable\par Impression clear lungs\par Comparison to February 7, 2020 without interval change\par \par PFT Nov 9 2020\par normal flow  rates minimal OAD \par normal lung volumes\par  Very mild / low nl DLCO  73 % pred\par HGB 13.5\par \par PET scan June 2020- for evidence of recurrent disease\par \par PFT August 3, 2020\par Flow rates normal with normal spirometry\par Total lung capacity normal 80% of predicted.\par Diffusion 79% predicted\par Hemoglobin 13.8.\par \par Pulmonary 6-minute walk step test August 3, 2020\par Total steps 120\par Room air Baseline room air O2 saturation 98%\par With increased maximum heart rate to 1 10–1 12 Aidan desaturation 94 to 95%\par Impression normal study\par Negative exertional hypoxemia\par \par X-ray PA lateral February 7, 2020\par Normal cardiac size\par No parenchymal infiltrate pleural effusions dominant pulmonary nodules\par Impression clear lungs\par \par Spirometry  11/19/2019\par  Normal  flow  rates\par  No BD  at  FEV!\par \par PFT 6/19/2019\par Normal Flow s Rates\par  12 Percent response to bronchodilator at FEV1\par Lung volumes normal\par  DLCO 80 %\par HGB14.3\par \par PET CT  No Evidence active disease July 2019\par  \par ECHO 1/2019\par  diastolic dysfx\par No reported PAP\par \par Urine  noted\par Spirometry 8/22/2019\par  without bronchodilator normal flow rates stable\par \par DTaP administration\par Completed to dose shingles Shingrix protocol\par \par Blood Draw\par \par Data review August 23, 2019\par CBC normal range\par TFTs normal\par Hemoglobin A1c 5.6% with mean plasma glucose 114\par Lipid profile\par Total cholesterol 135 HDL 35 LDL 69\par Triglycerides nonfasting 156\par Vitamin D 36.8\par Serum sodium 146\par Serum potassium 3.6\par BUN 16 creatinine 0.73\par Serum calcium 9.7\par Liver function testing normal\par \par HD FLU IM 9/20/21

## 2023-05-08 NOTE — DISCUSSION/SUMMARY
[FreeTextEntry1] : muscle cramps  and per  pain  management check check K+ Mg\par \par Abnl PFT with Diffusion abnormality\par Overall stable pul physiology without decline\par Postnasal drip\par Stable pulmonary physiology as noted.  Stable chest x-ray imaging and no evidence of a developing pleural effusion\par \par PNDS - GERD\par added flonase inc dose prilosec with nasal atelin\par \par  history of paroxysmal atrial fibrillation\par Cardiology noted Dr Cordero\par  ECHO\par  event monitor\par  Carotid duplex \par \par PET CT per Oncology July 2019 with APPT Eden Bassett July 7 2022\par Trial singulair 10 mg\par  Flonase  2 sprays [place] nostril QD\par \par Stable KAN-  with  component deconditioning\par Hodgkins x 5 yr  remission\par ECHO 5/7/21 \par LVH diastolic dysfx\par Flu  shot up to  date/ pneumonia up  to  date\par Walking exercise advised and follow-up 3 months\par \par PFIZER COVID vaccine  completed  2 dose protocol + booster and + BIVAlent\par Flu vaccine up to  date\par \par  mammogram states up to date per patient\par Timing bone density APril 2023 per ENDO with scheduled\par

## 2023-06-16 ENCOUNTER — NON-APPOINTMENT (OUTPATIENT)
Age: 83
End: 2023-06-16

## 2023-06-23 ENCOUNTER — RX RENEWAL (OUTPATIENT)
Age: 83
End: 2023-06-23

## 2023-06-26 ENCOUNTER — APPOINTMENT (OUTPATIENT)
Dept: PULMONOLOGY | Facility: CLINIC | Age: 83
End: 2023-06-26
Payer: MEDICARE

## 2023-06-26 ENCOUNTER — NON-APPOINTMENT (OUTPATIENT)
Age: 83
End: 2023-06-26

## 2023-06-26 VITALS
HEIGHT: 65 IN | SYSTOLIC BLOOD PRESSURE: 139 MMHG | DIASTOLIC BLOOD PRESSURE: 79 MMHG | HEART RATE: 90 BPM | BODY MASS INDEX: 31.65 KG/M2 | OXYGEN SATURATION: 95 % | WEIGHT: 190 LBS

## 2023-06-26 DIAGNOSIS — E87.6 HYPOKALEMIA: ICD-10-CM

## 2023-06-26 DIAGNOSIS — J30.9 ALLERGIC RHINITIS, UNSPECIFIED: ICD-10-CM

## 2023-06-26 LAB
ANION GAP SERPL CALC-SCNC: 14 MMOL/L
BUN SERPL-MCNC: 14 MG/DL
CALCIUM SERPL-MCNC: 9.9 MG/DL
CHLORIDE SERPL-SCNC: 103 MMOL/L
CO2 SERPL-SCNC: 28 MMOL/L
CREAT SERPL-MCNC: 0.74 MG/DL
EGFR: 81 ML/MIN/1.73M2
GLUCOSE SERPL-MCNC: 92 MG/DL
POTASSIUM SERPL-SCNC: 3.9 MMOL/L
SODIUM SERPL-SCNC: 144 MMOL/L

## 2023-06-26 PROCEDURE — 99214 OFFICE O/P EST MOD 30 MIN: CPT | Mod: 25

## 2023-06-26 PROCEDURE — 36415 COLL VENOUS BLD VENIPUNCTURE: CPT

## 2023-06-26 PROCEDURE — 95012 NITRIC OXIDE EXP GAS DETER: CPT

## 2023-06-26 PROCEDURE — 94060 EVALUATION OF WHEEZING: CPT

## 2023-06-26 RX ORDER — ROSUVASTATIN CALCIUM 40 MG/1
40 TABLET, FILM COATED ORAL
Refills: 0 | Status: DISCONTINUED | COMMUNITY
Start: 2020-07-15 | End: 2023-06-26

## 2023-06-26 NOTE — HISTORY OF PRESENT ILLNESS
[Stable] : are stable [Difficulty Breathing During Exertion] : stable dyspnea on exertion [Feelings Of Weakness On Exertion] : stable exercise intolerance [Cough] : denies coughing [Wheezing] : denies wheezing [Regional Soft Tissue Swelling Both Lower Extremities] : denies lower extremity edema [Chest Pain Or Discomfort] : denies chest pain [Fever] : denies fever [Obstructive Sleep Apnea] : obstructive sleep apnea [Date: ___] : Date of most recent diagnostic polysomnogram: [unfilled] [AHI: ___ per hour] : Apnea-hypopnea index:  [unfilled] per hour [Wt Gain ___ Lbs] : no recent weight gain [Oxygen] : the patient uses no supplemental oxygen [FreeTextEntry1] : Cardiology evaluate Isaac Cordero MD\par Paroxysmal atrial fibrillation\par Slightly abnormal coronary CTA\par Subsequent coronary angiographic revealed only mild nonobstructive disease\par She will follow up with a stress nuclear evaluation\par Palpitations fairly isolated but recurrent PACs and patient noted to be fair beta-blocker therapy\par Myalgias from statin therapy which will be discontinued\par Can discuss PCSK9 inhibitor therapy down the line\par \par Outside endocrinology labs provided for review\par Random glucose 98\par Hemoglobin A1c 5.8\par TSH 2.5\par Antithyroid anti-TPO antibodies normal range\par Vitamin D 31\par Because labs 260\par Serum electrolytes normal\par Total protein 6.4 normal range\par CBC normal range\par Platelet count 198,000\par Normal eosinophils\par Serum calcium 9.7\par Intact PTH 42.7 normal range\par \par \par Signatures\par  Electronically signed by : RIVER COLLADO D.O.; May 18 202\par \par f/u labs\par Serum electrolytes normal\par Renal function normal\par Creatinine 0.72\par GFR nl 84\par Serum potassium 4.5 normal range\par Post hypokalemia\par Patient was placed on potassium 10 mEq extended release daily\par per pain  management request lower ext cramps\par  B 12  and Mg\par \par Admission [facility]\par Chest pain\par History remote Hodgkin's lymphoma\par Active\par Reported clinical exam unremarkable\par CBC normal\par Serum electrolytes noted a low potassium 2.9 with creatinine 0.84\par History of hypertension upper extremity DVT atypical chest pain\par Patient was admitted to telemetry\par Cardiology evaluation rule out acute coronary syndrome\par Cardiac Cath  ASHD no stents PTCA No a FIB\par PAT ?\par addedbaby  ASA and K+ supplement\par at present not on lasix\par Imaging while hospitalized suggested on the CT chest there is possible small left pleural effusion\par \par pos massive drip  and sore throat with cough\par \par Dec 29 2022 NS - leg collapse ? lumbar disease txed with epidural\par pending L hip injection\par \par no active resp complaints \par awakens little had stiffness\par \par Cardiology reviewed and noted\par Patient states up-to-date with hematology oncology for which was MD\par \par KAN stable no decline\par Oncology\par appt set  non active remission Hodgkins

## 2023-06-26 NOTE — DISCUSSION/SUMMARY
[FreeTextEntry1] : muscle cramps  and per  pain  management check check K+ Mg OFF statin per  cardiology\par \par Abnl PFT with Diffusion abnormality\par Overall stable pul physiology without decline\par Postnasal drip\par Stable pulmonary physiology as noted.  Stable chest x-ray imaging and no evidence of a developing pleural effusion\par \par PNDS - GERD\par added flonase inc dose prilosec with nasal atelin\par \par  history of paroxysmal atrial fibrillation recurrent\par Cardiology noted Dr Cordero\par  ECHO\par  event monitor\par  Carotid duplex \par \par PET CT per Oncology July 2019 with APPT Eden Bassett July 7 2022\par Trial singulair 10 mg\par  Flonase  2 sprays [place] nostril QD\par \par Stable KAN-  with  component deconditioning\par Hodgkins x 5 yr  remission\par ECHO 5/7/21 \par LVH diastolic dysfx\par Flu  shot up to  date/ pneumonia up  to  date\par Walking exercise advised and follow-up 3 months\par \par PFIZER COVID vaccine  completed  2 dose protocol + booster and + BIVAlent\par Flu vaccine up to  date\par \par  mammogram states up to date per patient\par Timing bone density APril 2023 per ENDO with scheduled\par

## 2023-06-26 NOTE — PROCEDURE
[FreeTextEntry1] : NIOX  11  ppb WNL 6/26/23\par  Alpha nl flow  rates 6/26/23\par \par NIOX 17 normal range March 27, 2023\par \par PFT March 27, 2023\par Normal flow rates\par 21% response to bronchodilator at the small airways\par Normal\par TLC 95% predicted\par Diffusion normal range 76% predicted\par Hemoglobin 12.2\par No decline of multiple pulmonary physiology\par \par Chest x-ray PA lateral March 27, 2023\par Cardiac size normal\par Lung fields are clear\par No parenchymal infiltrates pleural effusions dominant pulmonary nodules\par Lamination right hemidiaphragm\par Impression clear lungs\par No evidence for residual pneumonia no evidence of a left pleural effusion\par \par \par PFT 11/16/2022\par Flow rates nl\par  Lung volumes nl\par  DLCO  64 % with mild  loss fx  alveolar  capillary units HGB 14.5\par \par Ck 7/6/22\par FVC 73 %\par stable pul flows\par \par PFT 3/31/22\par  Mild reduction FVC\par TLC 79 % \par DLCO 69 % minimal reduction\par HGB 12.6\par \par Pulmonary 6 minute exercise study\par 3/31 /22\par RA study  Negative \par \par Chest x-ray PA lateral March 31, 2022\par Normal cardiac size\par Clear lung fields\par No parenchymal infiltrates pleural effusions dominant pulmonary nodules\par Soft tissue bony structures unremarkable\par Mild kyphosis with thinning vertebral spine\par No gross interval change compared to chest x-ray of June 14, 2021\par \par PFT 9/20/21\par Minimal OAD\par Lung Volumes nl \par TLC 92 %\par  low nl DLCO 73 % \par HGB 14.6\par \par Chest x-ray PA lateral June 14, 2021\par Cardiac size normal\par Lung fields are clear\par Lucina mediastinum unremarkable\par Soft tissue bony structures unremarkable\par No parenchymal infiltrates pleural effusions with dominant pulmonary nodules\par Impression clear lungs\par \par EKG 6/14/21\par NSR\par \par PFT 3/15/21\par normal flow rates\par Lung Volumes  normal\par  DLCO  69 % mild  reduction\par HGB 14.0\par \par X-ray PA lateral March 15, 2021\par Cardiac size normal\par No parenchymal infiltrate pulmonary opacities pulmonary consolidation dominant pulmonary nodules pleural effusion pneumothorax\par Soft tissue bony structures unremarkable\par Lucina mediastinum grossly unremarkable\par Impression clear lungs\par Comparison to February 7, 2020 without interval change\par \par PFT Nov 9 2020\par normal flow  rates minimal OAD \par normal lung volumes\par  Very mild / low nl DLCO  73 % pred\par HGB 13.5\par \par PET scan June 2020- for evidence of recurrent disease\par \par PFT August 3, 2020\par Flow rates normal with normal spirometry\par Total lung capacity normal 80% of predicted.\par Diffusion 79% predicted\par Hemoglobin 13.8.\par \par Pulmonary 6-minute walk step test August 3, 2020\par Total steps 120\par Room air Baseline room air O2 saturation 98%\par With increased maximum heart rate to 1 10–1 12 Aidan desaturation 94 to 95%\par Impression normal study\par Negative exertional hypoxemia\par \par X-ray PA lateral February 7, 2020\par Normal cardiac size\par No parenchymal infiltrate pleural effusions dominant pulmonary nodules\par Impression clear lungs\par \par Spirometry  11/19/2019\par  Normal  flow  rates\par  No BD  at  FEV!\par \par PFT 6/19/2019\par Normal Flow s Rates\par  12 Percent response to bronchodilator at FEV1\par Lung volumes normal\par  DLCO 80 %\par HGB14.3\par \par PET CT  No Evidence active disease July 2019\par  \par ECHO 1/2019\par  diastolic dysfx\par No reported PAP\par \par Urine  noted\par Spirometry 8/22/2019\par  without bronchodilator normal flow rates stable\par \par DTaP administration\par Completed to dose shingles Shingrix protocol\par \par Blood Draw\par \par Data review August 23, 2019\par CBC normal range\par TFTs normal\par Hemoglobin A1c 5.6% with mean plasma glucose 114\par Lipid profile\par Total cholesterol 135 HDL 35 LDL 69\par Triglycerides nonfasting 156\par Vitamin D 36.8\par Serum sodium 146\par Serum potassium 3.6\par BUN 16 creatinine 0.73\par Serum calcium 9.7\par Liver function testing normal\par \par HD FLU IM 9/20/21\par \par blood  draw

## 2023-07-10 ENCOUNTER — RX RENEWAL (OUTPATIENT)
Age: 83
End: 2023-07-10

## 2023-08-28 ENCOUNTER — NON-APPOINTMENT (OUTPATIENT)
Age: 83
End: 2023-08-28

## 2023-09-05 NOTE — DISCHARGE NOTE NURSING/CASE MANAGEMENT/SOCIAL WORK - NSCORESITESY/N_GEN_A_CORE_RD
Tazorac Counseling:  Patient advised that medication is irritating and drying.  Patient may need to apply sparingly and wash off after an hour before eventually leaving it on overnight.  The patient verbalized understanding of the proper use and possible adverse effects of tazorac.  All of the patient's questions and concerns were addressed. Birth Control Pills Pregnancy And Lactation Text: This medication should be avoided if pregnant and for the first 30 days post-partum. Use Enhanced Medication Counseling?: No Dapsone Pregnancy And Lactation Text: This medication is Pregnancy Category C and is not considered safe during pregnancy or breast feeding. Tetracycline Pregnancy And Lactation Text: This medication is Pregnancy Category D and not consider safe during pregnancy. It is also excreted in breast milk. Isotretinoin Counseling: Patient should get monthly blood tests, not donate blood, not drive at night if vision affected, not share medication, and not undergo elective surgery for 6 months after tx completed. Side effects reviewed, pt to contact office should one occur. Spironolactone Pregnancy And Lactation Text: This medication can cause feminization of the male fetus and should be avoided during pregnancy. The active metabolite is also found in breast milk. Topical Sulfur Applications Pregnancy And Lactation Text: This medication is Pregnancy Category C and has an unknown safety profile during pregnancy. It is unknown if this topical medication is excreted in breast milk. High Dose Vitamin A Counseling: Side effects reviewed, pt to contact office should one occur. Topical Retinoid counseling:  Patient advised to apply a pea-sized amount only at bedtime and wait 30 minutes after washing their face before applying.  If too drying, patient may add a non-comedogenic moisturizer. The patient verbalized understanding of the proper use and possible adverse effects of retinoids.  All of the patient's questions and concerns were addressed. No Winlevi Pregnancy And Lactation Text: This medication is considered safe during pregnancy and breastfeeding. Benzoyl Peroxide Counseling: Patient counseled that medicine may cause skin irritation and bleach clothing.  In the event of skin irritation, the patient was advised to reduce the amount of the drug applied or use it less frequently.   The patient verbalized understanding of the proper use and possible adverse effects of benzoyl peroxide.  All of the patient's questions and concerns were addressed. Erythromycin Counseling:  I discussed with the patient the risks of erythromycin including but not limited to GI upset, allergic reaction, drug rash, diarrhea, increase in liver enzymes, and yeast infections. Azithromycin Pregnancy And Lactation Text: This medication is considered safe during pregnancy and is also secreted in breast milk. Bactrim Pregnancy And Lactation Text: This medication is Pregnancy Category D and is known to cause fetal risk.  It is also excreted in breast milk. Doxycycline Counseling:  Patient counseled regarding possible photosensitivity and increased risk for sunburn.  Patient instructed to avoid sunlight, if possible.  When exposed to sunlight, patients should wear protective clothing, sunglasses, and sunscreen.  The patient was instructed to call the office immediately if the following severe adverse effects occur:  hearing changes, easy bruising/bleeding, severe headache, or vision changes.  The patient verbalized understanding of the proper use and possible adverse effects of doxycycline.  All of the patient's questions and concerns were addressed. Tazorac Pregnancy And Lactation Text: This medication is not safe during pregnancy. It is unknown if this medication is excreted in breast milk. Azelaic Acid Counseling: Patient counseled that medicine may cause skin irritation and to avoid applying near the eyes.  In the event of skin irritation, the patient was advised to reduce the amount of the drug applied or use it less frequently.   The patient verbalized understanding of the proper use and possible adverse effects of azelaic acid.  All of the patient's questions and concerns were addressed. Topical Clindamycin Pregnancy And Lactation Text: This medication is Pregnancy Category B and is considered safe during pregnancy. It is unknown if it is excreted in breast milk. Isotretinoin Pregnancy And Lactation Text: This medication is Pregnancy Category X and is considered extremely dangerous during pregnancy. It is unknown if it is excreted in breast milk. Dapsone Counseling: I discussed with the patient the risks of dapsone including but not limited to hemolytic anemia, agranulocytosis, rashes, methemoglobinemia, kidney failure, peripheral neuropathy, headaches, GI upset, and liver toxicity.  Patients who start dapsone require monitoring including baseline LFTs and weekly CBCs for the first month, then every month thereafter.  The patient verbalized understanding of the proper use and possible adverse effects of dapsone.  All of the patient's questions and concerns were addressed. High Dose Vitamin A Pregnancy And Lactation Text: High dose vitamin A therapy is contraindicated during pregnancy and breast feeding. Topical Retinoid Pregnancy And Lactation Text: This medication is Pregnancy Category C. It is unknown if this medication is excreted in breast milk. Aklief counseling:  Patient advised to apply a pea-sized amount only at bedtime and wait 30 minutes after washing their face before applying.  If too drying, patient may add a non-comedogenic moisturizer.  The most commonly reported side effects including irritation, redness, scaling, dryness, stinging, burning, itching, and increased risk of sunburn.  The patient verbalized understanding of the proper use and possible adverse effects of retinoids.  All of the patient's questions and concerns were addressed. Birth Control Pills Counseling: Birth Control Pill Counseling: I discussed with the patient the potential side effects of OCPs including but not limited to increased risk of stroke, heart attack, thrombophlebitis, deep venous thrombosis, hepatic adenomas, breast changes, GI upset, headaches, and depression.  The patient verbalized understanding of the proper use and possible adverse effects of OCPs. All of the patient's questions and concerns were addressed. Spironolactone Counseling: Patient advised regarding risks of diarrhea, abdominal pain, hyperkalemia, birth defects (for female patients), liver toxicity and renal toxicity. The patient may need blood work to monitor liver and kidney function and potassium levels while on therapy. The patient verbalized understanding of the proper use and possible adverse effects of spironolactone.  All of the patient's questions and concerns were addressed. Benzoyl Peroxide Pregnancy And Lactation Text: This medication is Pregnancy Category C. It is unknown if benzoyl peroxide is excreted in breast milk. Tetracycline Counseling: Patient counseled regarding possible photosensitivity and increased risk for sunburn.  Patient instructed to avoid sunlight, if possible.  When exposed to sunlight, patients should wear protective clothing, sunglasses, and sunscreen.  The patient was instructed to call the office immediately if the following severe adverse effects occur:  hearing changes, easy bruising/bleeding, severe headache, or vision changes.  The patient verbalized understanding of the proper use and possible adverse effects of tetracycline.  All of the patient's questions and concerns were addressed. Patient understands to avoid pregnancy while on therapy due to potential birth defects. Winlevi Counseling:  I discussed with the patient the risks of topical clascoterone including but not limited to erythema, scaling, itching, and stinging. Patient voiced their understanding. Topical Sulfur Applications Counseling: Topical Sulfur Counseling: Patient counseled that this medication may cause skin irritation or allergic reactions.  In the event of skin irritation, the patient was advised to reduce the amount of the drug applied or use it less frequently.   The patient verbalized understanding of the proper use and possible adverse effects of topical sulfur application.  All of the patient's questions and concerns were addressed. Doxycycline Pregnancy And Lactation Text: This medication is Pregnancy Category D and not consider safe during pregnancy. It is also excreted in breast milk but is considered safe for shorter treatment courses. Bactrim Counseling:  I discussed with the patient the risks of sulfa antibiotics including but not limited to GI upset, allergic reaction, drug rash, diarrhea, dizziness, photosensitivity, and yeast infections.  Rarely, more serious reactions can occur including but not limited to aplastic anemia, agranulocytosis, methemoglobinemia, blood dyscrasias, liver or kidney failure, lung infiltrates or desquamative/blistering drug rashes. Detail Level: Zone Erythromycin Pregnancy And Lactation Text: This medication is Pregnancy Category B and is considered safe during pregnancy. It is also excreted in breast milk. Azelaic Acid Pregnancy And Lactation Text: This medication is considered safe during pregnancy and breast feeding. Minocycline Counseling: Patient advised regarding possible photosensitivity and discoloration of the teeth, skin, lips, tongue and gums.  Patient instructed to avoid sunlight, if possible.  When exposed to sunlight, patients should wear protective clothing, sunglasses, and sunscreen.  The patient was instructed to call the office immediately if the following severe adverse effects occur:  hearing changes, easy bruising/bleeding, severe headache, or vision changes.  The patient verbalized understanding of the proper use and possible adverse effects of minocycline.  All of the patient's questions and concerns were addressed. Aklief Pregnancy And Lactation Text: It is unknown if this medication is safe to use during pregnancy.  It is unknown if this medication is excreted in breast milk.  Breastfeeding women should use the topical cream on the smallest area of the skin for the shortest time needed while breastfeeding.  Do not apply to nipple and areola. Sarecycline Counseling: Patient advised regarding possible photosensitivity and discoloration of the teeth, skin, lips, tongue and gums.  Patient instructed to avoid sunlight, if possible.  When exposed to sunlight, patients should wear protective clothing, sunglasses, and sunscreen.  The patient was instructed to call the office immediately if the following severe adverse effects occur:  hearing changes, easy bruising/bleeding, severe headache, or vision changes.  The patient verbalized understanding of the proper use and possible adverse effects of sarecycline.  All of the patient's questions and concerns were addressed. Topical Clindamycin Counseling: Patient counseled that this medication may cause skin irritation or allergic reactions.  In the event of skin irritation, the patient was advised to reduce the amount of the drug applied or use it less frequently.   The patient verbalized understanding of the proper use and possible adverse effects of clindamycin.  All of the patient's questions and concerns were addressed. Azithromycin Counseling:  I discussed with the patient the risks of azithromycin including but not limited to GI upset, allergic reaction, drug rash, diarrhea, and yeast infections.

## 2023-09-27 ENCOUNTER — APPOINTMENT (OUTPATIENT)
Dept: PULMONOLOGY | Facility: CLINIC | Age: 83
End: 2023-09-27
Payer: MEDICARE

## 2023-09-27 VITALS — SYSTOLIC BLOOD PRESSURE: 130 MMHG | DIASTOLIC BLOOD PRESSURE: 73 MMHG

## 2023-09-27 VITALS — HEART RATE: 76 BPM | OXYGEN SATURATION: 95 % | DIASTOLIC BLOOD PRESSURE: 71 MMHG | SYSTOLIC BLOOD PRESSURE: 149 MMHG

## 2023-09-27 DIAGNOSIS — Z23 ENCOUNTER FOR IMMUNIZATION: ICD-10-CM

## 2023-09-27 DIAGNOSIS — I10 ESSENTIAL (PRIMARY) HYPERTENSION: ICD-10-CM

## 2023-09-27 DIAGNOSIS — G47.33 OBSTRUCTIVE SLEEP APNEA (ADULT) (PEDIATRIC): ICD-10-CM

## 2023-09-27 PROCEDURE — 99214 OFFICE O/P EST MOD 30 MIN: CPT | Mod: 25

## 2023-09-27 PROCEDURE — 90662 IIV NO PRSV INCREASED AG IM: CPT

## 2023-09-27 PROCEDURE — 94010 BREATHING CAPACITY TEST: CPT

## 2023-09-27 PROCEDURE — G0008: CPT

## 2023-09-27 PROCEDURE — 95012 NITRIC OXIDE EXP GAS DETER: CPT

## 2023-10-21 ENCOUNTER — RX RENEWAL (OUTPATIENT)
Age: 83
End: 2023-10-21

## 2023-11-06 ENCOUNTER — RX RENEWAL (OUTPATIENT)
Age: 83
End: 2023-11-06

## 2023-11-06 RX ORDER — AZELASTINE HYDROCHLORIDE 137 UG/1
137 SPRAY, METERED NASAL
Qty: 90 | Refills: 3 | Status: ACTIVE | COMMUNITY
Start: 2021-09-20 | End: 1900-01-01

## 2023-12-01 ENCOUNTER — NON-APPOINTMENT (OUTPATIENT)
Age: 83
End: 2023-12-01

## 2023-12-28 ENCOUNTER — APPOINTMENT (OUTPATIENT)
Dept: PULMONOLOGY | Facility: CLINIC | Age: 83
End: 2023-12-28
Payer: MEDICARE

## 2023-12-28 ENCOUNTER — NON-APPOINTMENT (OUTPATIENT)
Age: 83
End: 2023-12-28

## 2023-12-28 VITALS — OXYGEN SATURATION: 95 % | HEART RATE: 85 BPM

## 2023-12-28 VITALS — SYSTOLIC BLOOD PRESSURE: 148 MMHG | DIASTOLIC BLOOD PRESSURE: 72 MMHG

## 2023-12-28 DIAGNOSIS — R05.9 COUGH, UNSPECIFIED: ICD-10-CM

## 2023-12-28 DIAGNOSIS — I48.0 PAROXYSMAL ATRIAL FIBRILLATION: ICD-10-CM

## 2023-12-28 DIAGNOSIS — Z86.39 PERSONAL HISTORY OF OTHER ENDOCRINE, NUTRITIONAL AND METABOLIC DISEASE: ICD-10-CM

## 2023-12-28 LAB — POCT - HEMOGLOBIN (HGB), QUANTITATIVE, TRANSCUTANEOUS: 15

## 2023-12-28 PROCEDURE — 71046 X-RAY EXAM CHEST 2 VIEWS: CPT

## 2023-12-28 PROCEDURE — 93000 ELECTROCARDIOGRAM COMPLETE: CPT

## 2023-12-28 PROCEDURE — 88738 HGB QUANT TRANSCUTANEOUS: CPT

## 2023-12-28 PROCEDURE — 99214 OFFICE O/P EST MOD 30 MIN: CPT | Mod: 25

## 2023-12-28 PROCEDURE — 94727 GAS DIL/WSHOT DETER LNG VOL: CPT

## 2023-12-28 PROCEDURE — 94010 BREATHING CAPACITY TEST: CPT

## 2023-12-28 PROCEDURE — 94729 DIFFUSING CAPACITY: CPT

## 2023-12-28 PROCEDURE — 95012 NITRIC OXIDE EXP GAS DETER: CPT

## 2023-12-28 PROCEDURE — ZZZZZ: CPT

## 2023-12-28 PROCEDURE — 36415 COLL VENOUS BLD VENIPUNCTURE: CPT

## 2023-12-28 NOTE — DISCUSSION/SUMMARY
[FreeTextEntry1] :  KAN ? stress related  no use BD use  lab  lipid per patient and send Dr Cordero no  evidence abnl pulmonary physiology  Abnl PFT with Diffusion abnormality Overall stable pul physiology without decline Postnasal drip Stable pulmonary physiology as noted.  Stable chest x-ray imaging and no evidence of a developing pleural effusion  PNDS - GERD added flonase inc dose prilosec with nasal atelin   history of paroxysmal atrial fibrillation recurrent Cardiology noted Dr Cordero  ECHO  event monitor  Carotid duplex   PET CT per Oncology July 2019 with APPT Eden Bassett July 7 2022 Trial singulair 10 mg  Flonase  2 sprays beach nostril QD  Stable KAN-  with  component deconditioning Hodgkins x 5 yr  remission ECHO 5/7/21  LVH diastolic dysfx Flu  shot up to  date/ pneumonia up  to  date Walking exercise advised and follow-up 3 months  PFIZER COVID vaccine  completed  2 dose protocol + booster and + BIVAlent Flu vaccine up to  date   mammogram states up to date per patient Timing bone density APril 2023 per ENDO with scheduled

## 2023-12-28 NOTE — PROCEDURE
[FreeTextEntry1] : Blood draw EKG 12/28/23 NSR PAC Not  A Fib  NIOX 10 oppb WNL  12/28/23  PFT 12/28/23  minimal reductio at flow  rates  mild OAD  Lung volumes nl  No  air trapping  DLCO 66 % with mild loss fx  alveolar  capillary units  HGB 15.0  Chest x-ray PA lateral December 20, 2023 indication dyspnea Cardiac size normal Lamination regimine diaphragm Lower lumbar scoliosis Lung fields are clear No parenchymal infiltrate pleural effusion dominant pulmonary nodule Soft tissue bony structures unremarkable  PFT 7/28/2023 THYROID WITH A MILD OBSTRUCTIVE PATTERN  TLC 83% PREDICTED DIFFUSION JUST BELOW NORMAL RANGE 66% PREDICTED WITH MINIMAL LOSS OF FUNCTIONING ALVEOLAR CAPILLARY UNITS HEMOGLOBIN 15.0 THERE IS SOME MILD DECLINE AT THE DIFFUSION AND TOTAL LUNG CAPACITY    NIOX  12  ppb WNL 9/27/23 Ck 9/27/23  nl  flows  NIOX  11  ppb WNL 6/26/23  Ck nl flow  rates 6/26/23  NIOX 17 normal range March 27, 2023  PFT March 27, 2023 Normal flow rates 21% response to bronchodilator at the small airways Normal TLC 95% predicted Diffusion normal range 76% predicted Hemoglobin 12.2 No decline of multiple pulmonary physiology  Chest x-ray PA lateral March 27, 2023 Cardiac size normal Lung fields are clear No parenchymal infiltrates pleural effusions dominant pulmonary nodules Lamination right hemidiaphragm Impression clear lungs No evidence for residual pneumonia no evidence of a left pleural effusion   PFT 11/16/2022 Flow rates nl  Lung volumes nl  DLCO  64 % with mild  loss fx  alveolar  capillary units HGB 14.5  [place] 7/6/22 FVC 73 % stable pul flows  PFT 3/31/22  Mild reduction FVC TLC 79 %  DLCO 69 % minimal reduction HGB 12.6  Pulmonary 6 minute exercise study 3/31 /22 RA study  Negative   Chest x-ray PA lateral March 31, 2022 Normal cardiac size Clear lung fields No parenchymal infiltrates pleural effusions dominant pulmonary nodules Soft tissue bony structures unremarkable Mild kyphosis with thinning vertebral spine No gross interval change compared to chest x-ray of June 14, 2021  PFT 9/20/21 Minimal OAD Lung Volumes nl  TLC 92 %  low nl DLCO 73 %  HGB 14.6  Chest x-ray PA lateral June 14, 2021 Cardiac size normal Lung fields are clear Lucina mediastinum unremarkable Soft tissue bony structures unremarkable No parenchymal infiltrates pleural effusions with dominant pulmonary nodules Impression clear lungs  EKG 6/14/21 NSR  PFT 3/15/21 normal flow rates Lung Volumes  normal  DLCO  69 % mild  reduction HGB 14.0  X-ray PA lateral March 15, 2021 Cardiac size normal No parenchymal infiltrate pulmonary opacities pulmonary consolidation dominant pulmonary nodules pleural effusion pneumothorax Soft tissue bony structures unremarkable Lucina mediastinum grossly unremarkable Impression clear lungs Comparison to February 7, 2020 without interval change  PFT Nov 9 2020 normal flow  rates minimal OAD  normal lung volumes  Very mild / low nl DLCO  73 % pred HGB 13.5  PET scan June 2020- for evidence of recurrent disease  PFT August 3, 2020 Flow rates normal with normal spirometry Total lung capacity normal 80% of predicted. Diffusion 79% predicted Hemoglobin 13.8.  Pulmonary 6-minute walk step test August 3, 2020 Total steps 120 Room air Baseline room air O2 saturation 98% With increased maximum heart rate to 1 10-1 12 Aidan desaturation 94 to 95% Impression normal study Negative exertional hypoxemia  X-ray PA lateral February 7, 2020 Normal cardiac size No parenchymal infiltrate pleural effusions dominant pulmonary nodules Impression clear lungs  Spirometry  11/19/2019  Normal  flow  rates  No BD  at  FEV!  PFT 6/19/2019 Normal Flow s Rates  12 Percent response to bronchodilator at FEV1 Lung volumes normal  DLCO 80 % HGB14.3  PET CT  No Evidence active disease July 2019   ECHO 1/2019  diastolic dysfx No reported PAP  Urine  noted Spirometry 8/22/2019  without bronchodilator normal flow rates stable  DTaP administration Completed to dose shingles Shingrix protocol  HD FLU IM 9/27/23

## 2023-12-28 NOTE — PHYSICAL EXAM
[General Appearance - Well Developed] : well developed [Normal Appearance] : normal appearance [Well Groomed] : well groomed [General Appearance - Well Nourished] : well nourished [No Deformities] : no deformities [General Appearance - In No Acute Distress] : no acute distress [Normal Conjunctiva] : the conjunctiva exhibited no abnormalities [Eyelids - No Xanthelasma] : the eyelids demonstrated no xanthelasmas [Normal Oropharynx] : normal oropharynx [II] : II [Neck Appearance] : the appearance of the neck was normal [Neck Cervical Mass (___cm)] : no neck mass was observed [Jugular Venous Distention Increased] : there was no jugular-venous distention [Thyroid Diffuse Enlargement] : the thyroid was not enlarged [Thyroid Nodule] : there were no palpable thyroid nodules [Murmurs] : no murmurs present [Arterial Pulses Normal] : the arterial pulses were normal [Edema] : no peripheral edema present [Veins - Varicosity Changes] : no varicosital changes were noted in the lower extremities [Respiration, Rhythm And Depth] : normal respiratory rhythm and effort [Exaggerated Use Of Accessory Muscles For Inspiration] : no accessory muscle use [Auscultation Breath Sounds / Voice Sounds] : lungs were clear to auscultation bilaterally [Chest Palpation] : palpation of the chest revealed no abnormalities [Lungs Percussion] : the lungs were normal to percussion [Bowel Sounds] : normal bowel sounds [Abdomen Soft] : soft [Abdomen Tenderness] : non-tender [Abdomen Mass (___ Cm)] : no abdominal mass palpated [Abnormal Walk] : normal gait [Gait - Sufficient For Exercise Testing] : the gait was sufficient for exercise testing [Nail Clubbing] : no clubbing of the fingernails [Cyanosis, Localized] : no localized cyanosis [Petechial Hemorrhages (___cm)] : no petechial hemorrhages [] : no ischemic changes [Deep Tendon Reflexes (DTR)] : deep tendon reflexes were 2+ and symmetric [Sensation] : the sensory exam was normal to light touch and pinprick [No Focal Deficits] : no focal deficits [Oriented To Time, Place, And Person] : oriented to person, place, and time [Impaired Insight] : insight and judgment were intact [Affect] : the affect was normal [FreeTextEntry1] : rigfht hand erythma burn  first  degreee no evidence pustules cysts or  drainage

## 2023-12-28 NOTE — HISTORY OF PRESENT ILLNESS
[Stable] : are stable [Difficulty Breathing During Exertion] : stable dyspnea on exertion [Feelings Of Weakness On Exertion] : stable exercise intolerance [Cough] : denies coughing [Wheezing] : denies wheezing [Regional Soft Tissue Swelling Both Lower Extremities] : denies lower extremity edema [Chest Pain Or Discomfort] : denies chest pain [Fever] : denies fever [Obstructive Sleep Apnea] : obstructive sleep apnea [Date: ___] : Date of most recent diagnostic polysomnogram: [unfilled] [AHI: ___ per hour] : Apnea-hypopnea index:  [unfilled] per hour [Wt Gain ___ Lbs] : no recent weight gain [Oxygen] : the patient uses no supplemental oxygen [FreeTextEntry1] : ? stress related in SOB  Cardiology evaluate Isaac Cordero MD issue did  not  tolerate statin and was changed Repatha but still with  cramps Paroxysmal atrial fibrillation Slightly abnormal coronary CTA Subsequent coronary angiographic revealed only mild nonobstructive disease She will follow up with a stress nuclear evaluation Palpitations fairly isolated but recurrent PACs and patient noted to be fair beta-blocker therapy Myalgias from statin therapy which will be discontinued Can discuss PCSK9 inhibitor therapy down the line  Outside endocrinology labs provided for review Random glucose 98 Hemoglobin A1c 5.8 TSH 2.5 Antithyroid anti-TPO antibodies normal range Vitamin D 31 Because labs 260 Serum electrolytes normal Total protein 6.4 normal range CBC normal range Platelet count 198,000 Normal eosinophils Serum calcium 9.7 Intact PTH 42.7 normal range   Signatures  Electronically signed by : RIVER COLLADO D.O.; May 18 202  f/u labs Serum electrolytes normal Renal function normal Creatinine 0.72 GFR nl 84 Serum potassium 4.5 normal range Post hypokalemia Patient was placed on potassium 10 mEq extended release daily per pain  management request lower ext cramps  B 12  and Mg  Admission [facility] Chest pain History remote Hodgkin's lymphoma Active Reported clinical exam unremarkable CBC normal Serum electrolytes noted a low potassium 2.9 with creatinine 0.84 History of hypertension upper extremity DVT atypical chest pain Patient was admitted to telemetry Cardiology evaluation rule out acute coronary syndrome Cardiac Cath  ASHD no stents PTCA No a FIB PAT ? addedbaby  ASA and K+ supplement at present not on lasix Imaging while hospitalized suggested on the CT chest there is possible small left pleural effusion  pos massive drip  and sore throat with cough  Dec 29 2022 NS - leg collapse ? lumbar disease txed with epidural pending L hip injection  no active resp complaints  awakens little had stiffness  Cardiology reviewed and noted Patient states up-to-date with hematology oncology for which was MD KAN stable no decline Oncology appt set  non active remission Hodgkins

## 2023-12-29 ENCOUNTER — NON-APPOINTMENT (OUTPATIENT)
Age: 83
End: 2023-12-29

## 2023-12-29 LAB
ALBUMIN SERPL ELPH-MCNC: 4.5 G/DL
ALP BLD-CCNC: 90 U/L
ALT SERPL-CCNC: 20 U/L
ANION GAP SERPL CALC-SCNC: 14 MMOL/L
AST SERPL-CCNC: 18 U/L
BILIRUB SERPL-MCNC: 0.4 MG/DL
BUN SERPL-MCNC: 19 MG/DL
CALCIUM SERPL-MCNC: 9.8 MG/DL
CHLORIDE SERPL-SCNC: 102 MMOL/L
CHOLEST SERPL-MCNC: 157 MG/DL
CO2 SERPL-SCNC: 26 MMOL/L
CREAT SERPL-MCNC: 0.72 MG/DL
EGFR: 83 ML/MIN/1.73M2
GLUCOSE SERPL-MCNC: 99 MG/DL
HDLC SERPL-MCNC: 42 MG/DL
LDLC SERPL CALC-MCNC: 81 MG/DL
NONHDLC SERPL-MCNC: 115 MG/DL
POTASSIUM SERPL-SCNC: 3.9 MMOL/L
PROT SERPL-MCNC: 6.6 G/DL
SODIUM SERPL-SCNC: 142 MMOL/L
TRIGL SERPL-MCNC: 202 MG/DL

## 2024-01-13 ENCOUNTER — RX RENEWAL (OUTPATIENT)
Age: 84
End: 2024-01-13

## 2024-04-01 ENCOUNTER — APPOINTMENT (OUTPATIENT)
Dept: PULMONOLOGY | Facility: CLINIC | Age: 84
End: 2024-04-01
Payer: MEDICARE

## 2024-04-01 VITALS — SYSTOLIC BLOOD PRESSURE: 138 MMHG | DIASTOLIC BLOOD PRESSURE: 78 MMHG

## 2024-04-01 VITALS
OXYGEN SATURATION: 94 % | RESPIRATION RATE: 16 BRPM | SYSTOLIC BLOOD PRESSURE: 160 MMHG | HEART RATE: 98 BPM | DIASTOLIC BLOOD PRESSURE: 85 MMHG

## 2024-04-01 DIAGNOSIS — R06.09 OTHER FORMS OF DYSPNEA: ICD-10-CM

## 2024-04-01 DIAGNOSIS — C81.90 HODGKIN LYMPHOMA, UNSPECIFIED, UNSPECIFIED SITE: ICD-10-CM

## 2024-04-01 DIAGNOSIS — J98.59 OTHER DISEASES OF MEDIASTINUM, NOT ELSEWHERE CLASSIFIED: ICD-10-CM

## 2024-04-01 DIAGNOSIS — J44.9 CHRONIC OBSTRUCTIVE PULMONARY DISEASE, UNSPECIFIED: ICD-10-CM

## 2024-04-01 PROCEDURE — 95012 NITRIC OXIDE EXP GAS DETER: CPT

## 2024-04-01 PROCEDURE — 94010 BREATHING CAPACITY TEST: CPT

## 2024-04-01 PROCEDURE — 99214 OFFICE O/P EST MOD 30 MIN: CPT | Mod: 25

## 2024-04-01 NOTE — PHYSICAL EXAM
[General Appearance - Well Developed] : well developed [Normal Appearance] : normal appearance [Well Groomed] : well groomed [General Appearance - Well Nourished] : well nourished [General Appearance - In No Acute Distress] : no acute distress [No Deformities] : no deformities [Normal Conjunctiva] : the conjunctiva exhibited no abnormalities [Eyelids - No Xanthelasma] : the eyelids demonstrated no xanthelasmas [Normal Oropharynx] : normal oropharynx [II] : II [Neck Appearance] : the appearance of the neck was normal [Neck Cervical Mass (___cm)] : no neck mass was observed [Jugular Venous Distention Increased] : there was no jugular-venous distention [Thyroid Diffuse Enlargement] : the thyroid was not enlarged [Thyroid Nodule] : there were no palpable thyroid nodules [Murmurs] : no murmurs present [Edema] : no peripheral edema present [Arterial Pulses Normal] : the arterial pulses were normal [Veins - Varicosity Changes] : no varicosital changes were noted in the lower extremities [Respiration, Rhythm And Depth] : normal respiratory rhythm and effort [Auscultation Breath Sounds / Voice Sounds] : lungs were clear to auscultation bilaterally [Exaggerated Use Of Accessory Muscles For Inspiration] : no accessory muscle use [Chest Palpation] : palpation of the chest revealed no abnormalities [Bowel Sounds] : normal bowel sounds [Lungs Percussion] : the lungs were normal to percussion [Abdomen Soft] : soft [Abdomen Tenderness] : non-tender [Abdomen Mass (___ Cm)] : no abdominal mass palpated [Abnormal Walk] : normal gait [Gait - Sufficient For Exercise Testing] : the gait was sufficient for exercise testing [Nail Clubbing] : no clubbing of the fingernails [Petechial Hemorrhages (___cm)] : no petechial hemorrhages [Cyanosis, Localized] : no localized cyanosis [] : no ischemic changes [Deep Tendon Reflexes (DTR)] : deep tendon reflexes were 2+ and symmetric [No Focal Deficits] : no focal deficits [Sensation] : the sensory exam was normal to light touch and pinprick [Oriented To Time, Place, And Person] : oriented to person, place, and time [Impaired Insight] : insight and judgment were intact [Affect] : the affect was normal [FreeTextEntry1] : rigfht hand erythma burn  first  degreee no evidence pustules cysts or  drainage

## 2024-04-01 NOTE — HISTORY OF PRESENT ILLNESS
[Stable] : are stable [Feelings Of Weakness On Exertion] : stable exercise intolerance [Difficulty Breathing During Exertion] : stable dyspnea on exertion [Cough] : denies coughing [Wheezing] : denies wheezing [Regional Soft Tissue Swelling Both Lower Extremities] : denies lower extremity edema [Chest Pain Or Discomfort] : denies chest pain [Fever] : denies fever [Obstructive Sleep Apnea] : obstructive sleep apnea [Date: ___] : Date of most recent diagnostic polysomnogram: [unfilled] [AHI: ___ per hour] : Apnea-hypopnea index:  [unfilled] per hour [Wt Gain ___ Lbs] : no recent weight gain [Oxygen] : the patient uses no supplemental oxygen [FreeTextEntry1] : ? stress related in SOB  Cardiology evaluate Isaac Cordero MD issue did  not  tolerate statin and was changed Repatha but still with  cramps Paroxysmal atrial fibrillation Slightly abnormal coronary CTA Subsequent coronary angiographic revealed only mild nonobstructive disease She will follow up with a stress nuclear evaluation Palpitations fairly isolated but recurrent PACs and patient noted to be fair beta-blocker therapy Myalgias from statin therapy which will be discontinued Can discuss PCSK9 inhibitor therapy down the line  Outside endocrinology labs provided for review Random glucose 98 Hemoglobin A1c 5.8 TSH 2.5 Antithyroid anti-TPO antibodies normal range Vitamin D 31 Because labs 260 Serum electrolytes normal Total protein 6.4 normal range CBC normal range Platelet count 198,000 Normal eosinophils Serum calcium 9.7 Intact PTH 42.7 normal range   Signatures  Electronically signed by : RIVER COLLADO D.O.; May 18 202  f/u labs Serum electrolytes normal Renal function normal Creatinine 0.72 GFR nl 84 Serum potassium 4.5 normal range Post hypokalemia Patient was placed on potassium 10 mEq extended release daily per pain  management request lower ext cramps  B 12  and Mg  Admission [facility] Chest pain History remote Hodgkin's lymphoma Active Reported clinical exam unremarkable CBC normal Serum electrolytes noted a low potassium 2.9 with creatinine 0.84 History of hypertension upper extremity DVT atypical chest pain Patient was admitted to telemetry Cardiology evaluation rule out acute coronary syndrome Cardiac Cath  ASHD no stents PTCA No a FIB PAT ? addedbaby  ASA and K+ supplement at present not on lasix Imaging while hospitalized suggested on the CT chest there is possible small left pleural effusion  pos massive drip  and sore throat with cough  Dec 29 2022 NS - leg collapse ? lumbar disease txed with epidural pending L hip injection  no active resp complaints  awakens little had stiffness  Cardiology reviewed and noted Patient states up-to-date with hematology oncology for which was MD KAN stable no decline Oncology appt set  non active remission Hodgkins

## 2024-04-01 NOTE — PROCEDURE
[FreeTextEntry1] : [place] 4/1/24 flow  rates stable  NIOX 13  ppb WNL 4/1/24  EKG 12/28/23 NSR PAC Not  A Fib  NIOX 10 oppb WNL  12/28/23  PFT 12/28/23  minimal reductio at flow  rates  mild OAD  Lung volumes nl  No  air trapping  DLCO 66 % with mild loss fx  alveolar  capillary units  HGB 15.0  Chest x-ray PA lateral December 20, 2023 indication dyspnea Cardiac size normal Lamination regimine diaphragm Lower lumbar scoliosis Lung fields are clear No parenchymal infiltrate pleural effusion dominant pulmonary nodule Soft tissue bony structures unremarkable  PFT 7/28/2023 THYROID WITH A MILD OBSTRUCTIVE PATTERN  TLC 83% PREDICTED DIFFUSION JUST BELOW NORMAL RANGE 66% PREDICTED WITH MINIMAL LOSS OF FUNCTIONING ALVEOLAR CAPILLARY UNITS HEMOGLOBIN 15.0 THERE IS SOME MILD DECLINE AT THE DIFFUSION AND TOTAL LUNG CAPACITY    NIOX  12  ppb WNL 9/27/23 Ck 9/27/23  nl  flows  NIOX  11  ppb WNL 6/26/23  [place] nl flow  rates 6/26/23  NIOX 17 normal range March 27, 2023  PFT March 27, 2023 Normal flow rates 21% response to bronchodilator at the small airways Normal TLC 95% predicted Diffusion normal range 76% predicted Hemoglobin 12.2 No decline of multiple pulmonary physiology  Chest x-ray PA lateral March 27, 2023 Cardiac size normal Lung fields are clear No parenchymal infiltrates pleural effusions dominant pulmonary nodules Lamination right hemidiaphragm Impression clear lungs No evidence for residual pneumonia no evidence of a left pleural effusion   PFT 11/16/2022 Flow rates nl  Lung volumes nl  DLCO  64 % with mild  loss fx  alveolar  capillary units HGB 14.5  Ck 7/6/22 FVC 73 % stable pul flows  PFT 3/31/22  Mild reduction FVC TLC 79 %  DLCO 69 % minimal reduction HGB 12.6  Pulmonary 6 minute exercise study 3/31 /22 RA study  Negative   Chest x-ray PA lateral March 31, 2022 Normal cardiac size Clear lung fields No parenchymal infiltrates pleural effusions dominant pulmonary nodules Soft tissue bony structures unremarkable Mild kyphosis with thinning vertebral spine No gross interval change compared to chest x-ray of June 14, 2021  PFT 9/20/21 Minimal OAD Lung Volumes nl  TLC 92 %  low nl DLCO 73 %  HGB 14.6  Chest x-ray PA lateral June 14, 2021 Cardiac size normal Lung fields are clear Lucina mediastinum unremarkable Soft tissue bony structures unremarkable No parenchymal infiltrates pleural effusions with dominant pulmonary nodules Impression clear lungs  EKG 6/14/21 NSR  PFT 3/15/21 normal flow rates Lung Volumes  normal  DLCO  69 % mild  reduction HGB 14.0  X-ray PA lateral March 15, 2021 Cardiac size normal No parenchymal infiltrate pulmonary opacities pulmonary consolidation dominant pulmonary nodules pleural effusion pneumothorax Soft tissue bony structures unremarkable Lucina mediastinum grossly unremarkable Impression clear lungs Comparison to February 7, 2020 without interval change  PFT Nov 9 2020 normal flow  rates minimal OAD  normal lung volumes  Very mild / low nl DLCO  73 % pred HGB 13.5  PET scan June 2020- for evidence of recurrent disease  PFT August 3, 2020 Flow rates normal with normal spirometry Total lung capacity normal 80% of predicted. Diffusion 79% predicted Hemoglobin 13.8.  Pulmonary 6-minute walk step test August 3, 2020 Total steps 120 Room air Baseline room air O2 saturation 98% With increased maximum heart rate to 1 10-1 12 Aidan desaturation 94 to 95% Impression normal study Negative exertional hypoxemia  X-ray PA lateral February 7, 2020 Normal cardiac size No parenchymal infiltrate pleural effusions dominant pulmonary nodules Impression clear lungs  Spirometry  11/19/2019  Normal  flow  rates  No BD  at  FEV!  PFT 6/19/2019 Normal Flow s Rates  12 Percent response to bronchodilator at FEV1 Lung volumes normal  DLCO 80 % HGB14.3  PET CT  No Evidence active disease July 2019   ECHO 1/2019  diastolic dysfx No reported PAP  Urine  noted Spirometry 8/22/2019  without bronchodilator normal flow rates stable  DTaP administration Completed to dose shingles Shingrix protocol  HD FLU IM 9/27/23

## 2024-04-05 RX ORDER — HYDROCORTISONE 25 MG/G
2.5 CREAM TOPICAL TWICE DAILY
Qty: 1 | Refills: 0 | Status: ACTIVE | COMMUNITY
Start: 2024-04-05 | End: 1900-01-01

## 2024-04-08 ENCOUNTER — RX RENEWAL (OUTPATIENT)
Age: 84
End: 2024-04-08

## 2024-04-08 RX ORDER — OMEPRAZOLE 40 MG/1
40 CAPSULE, DELAYED RELEASE ORAL
Qty: 90 | Refills: 1 | Status: ACTIVE | COMMUNITY
Start: 2021-09-20 | End: 1900-01-01

## 2024-06-07 RX ORDER — AMLODIPINE BESYLATE 5 MG/1
5 TABLET ORAL
Qty: 30 | Refills: 3 | Status: DISCONTINUED | COMMUNITY
Start: 2024-05-10 | End: 2024-06-07

## 2024-06-07 RX ORDER — LOSARTAN POTASSIUM AND HYDROCHLOROTHIAZIDE 100; 25 MG/1; MG/1
100-25 TABLET, FILM COATED ORAL
Refills: 0 | Status: ACTIVE | COMMUNITY
Start: 2020-04-06

## 2024-07-11 ENCOUNTER — APPOINTMENT (OUTPATIENT)
Dept: PULMONOLOGY | Facility: CLINIC | Age: 84
End: 2024-07-11

## 2024-10-01 ENCOUNTER — RX RENEWAL (OUTPATIENT)
Age: 84
End: 2024-10-01

## 2024-11-15 ENCOUNTER — APPOINTMENT (OUTPATIENT)
Dept: PULMONOLOGY | Facility: CLINIC | Age: 84
End: 2024-11-15
Payer: MEDICARE

## 2024-11-15 ENCOUNTER — APPOINTMENT (OUTPATIENT)
Dept: PULMONOLOGY | Facility: CLINIC | Age: 84
End: 2024-11-15

## 2024-11-15 VITALS
HEART RATE: 71 BPM | WEIGHT: 187 LBS | DIASTOLIC BLOOD PRESSURE: 83 MMHG | BODY MASS INDEX: 31.12 KG/M2 | SYSTOLIC BLOOD PRESSURE: 125 MMHG | OXYGEN SATURATION: 95 %

## 2024-11-15 DIAGNOSIS — G47.33 OBSTRUCTIVE SLEEP APNEA (ADULT) (PEDIATRIC): ICD-10-CM

## 2024-11-15 DIAGNOSIS — I48.91 UNSPECIFIED ATRIAL FIBRILLATION: ICD-10-CM

## 2024-11-15 DIAGNOSIS — J44.9 CHRONIC OBSTRUCTIVE PULMONARY DISEASE, UNSPECIFIED: ICD-10-CM

## 2024-11-15 DIAGNOSIS — C81.90 HODGKIN LYMPHOMA, UNSPECIFIED, UNSPECIFIED SITE: ICD-10-CM

## 2024-11-15 LAB — POCT - HEMOGLOBIN (HGB), QUANTITATIVE, TRANSCUTANEOUS: 14.5

## 2024-11-15 PROCEDURE — 94729 DIFFUSING CAPACITY: CPT

## 2024-11-15 PROCEDURE — ZZZZZ: CPT

## 2024-11-15 PROCEDURE — 99214 OFFICE O/P EST MOD 30 MIN: CPT | Mod: 25

## 2024-11-15 PROCEDURE — 88738 HGB QUANT TRANSCUTANEOUS: CPT

## 2024-11-15 PROCEDURE — 94010 BREATHING CAPACITY TEST: CPT

## 2024-11-15 PROCEDURE — 94727 GAS DIL/WSHOT DETER LNG VOL: CPT

## 2024-11-15 PROCEDURE — 95012 NITRIC OXIDE EXP GAS DETER: CPT

## 2024-11-15 PROCEDURE — 71046 X-RAY EXAM CHEST 2 VIEWS: CPT

## 2024-11-15 RX ORDER — APIXABAN 5 MG/1
5 TABLET, FILM COATED ORAL
Qty: 180 | Refills: 3 | Status: ACTIVE | COMMUNITY
Start: 2024-11-15 | End: 1900-01-01

## 2024-11-15 RX ORDER — METOPROLOL SUCCINATE 50 MG/1
50 TABLET, EXTENDED RELEASE ORAL DAILY
Qty: 90 | Refills: 1 | Status: ACTIVE | COMMUNITY
Start: 2024-11-15 | End: 1900-01-01

## 2024-11-18 ENCOUNTER — NON-APPOINTMENT (OUTPATIENT)
Age: 84
End: 2024-11-18

## 2025-03-27 ENCOUNTER — RX RENEWAL (OUTPATIENT)
Age: 85
End: 2025-03-27

## 2025-03-29 ENCOUNTER — NON-APPOINTMENT (OUTPATIENT)
Age: 85
End: 2025-03-29

## 2025-03-31 ENCOUNTER — NON-APPOINTMENT (OUTPATIENT)
Age: 85
End: 2025-03-31

## 2025-03-31 ENCOUNTER — APPOINTMENT (OUTPATIENT)
Dept: SURGICAL ONCOLOGY | Facility: CLINIC | Age: 85
End: 2025-03-31
Payer: MEDICARE

## 2025-03-31 VITALS
BODY MASS INDEX: 29.66 KG/M2 | RESPIRATION RATE: 16 BRPM | DIASTOLIC BLOOD PRESSURE: 90 MMHG | HEART RATE: 96 BPM | OXYGEN SATURATION: 98 % | SYSTOLIC BLOOD PRESSURE: 150 MMHG | WEIGHT: 178 LBS | HEIGHT: 65 IN

## 2025-03-31 DIAGNOSIS — N60.09 SOLITARY CYST OF UNSPECIFIED BREAST: ICD-10-CM

## 2025-03-31 PROCEDURE — 99203 OFFICE O/P NEW LOW 30 MIN: CPT

## 2025-05-09 ENCOUNTER — APPOINTMENT (OUTPATIENT)
Dept: PULMONOLOGY | Facility: CLINIC | Age: 85
End: 2025-05-09

## 2025-06-02 ENCOUNTER — APPOINTMENT (OUTPATIENT)
Dept: PULMONOLOGY | Facility: CLINIC | Age: 85
End: 2025-06-02
Payer: MEDICARE

## 2025-06-02 VITALS — HEART RATE: 91 BPM | SYSTOLIC BLOOD PRESSURE: 148 MMHG | DIASTOLIC BLOOD PRESSURE: 77 MMHG | OXYGEN SATURATION: 94 %

## 2025-06-02 DIAGNOSIS — R06.09 OTHER FORMS OF DYSPNEA: ICD-10-CM

## 2025-06-02 DIAGNOSIS — G47.33 OBSTRUCTIVE SLEEP APNEA (ADULT) (PEDIATRIC): ICD-10-CM

## 2025-06-02 DIAGNOSIS — J44.9 CHRONIC OBSTRUCTIVE PULMONARY DISEASE, UNSPECIFIED: ICD-10-CM

## 2025-06-02 LAB — POCT - HEMOGLOBIN (HGB), QUANTITATIVE, TRANSCUTANEOUS: 15

## 2025-06-02 PROCEDURE — ZZZZZ: CPT

## 2025-06-02 PROCEDURE — 94618 PULMONARY STRESS TESTING: CPT

## 2025-06-02 PROCEDURE — 95012 NITRIC OXIDE EXP GAS DETER: CPT

## 2025-06-02 PROCEDURE — 94010 BREATHING CAPACITY TEST: CPT

## 2025-06-02 PROCEDURE — 94729 DIFFUSING CAPACITY: CPT

## 2025-06-02 PROCEDURE — 94727 GAS DIL/WSHOT DETER LNG VOL: CPT

## 2025-06-02 PROCEDURE — 88738 HGB QUANT TRANSCUTANEOUS: CPT

## 2025-06-02 PROCEDURE — 99214 OFFICE O/P EST MOD 30 MIN: CPT | Mod: 25

## 2025-08-06 ENCOUNTER — RX RENEWAL (OUTPATIENT)
Age: 85
End: 2025-08-06

## 2025-09-20 ENCOUNTER — RX RENEWAL (OUTPATIENT)
Age: 85
End: 2025-09-20